# Patient Record
Sex: FEMALE | Race: ASIAN | NOT HISPANIC OR LATINO
[De-identification: names, ages, dates, MRNs, and addresses within clinical notes are randomized per-mention and may not be internally consistent; named-entity substitution may affect disease eponyms.]

---

## 2018-11-01 ENCOUNTER — RESULT REVIEW (OUTPATIENT)
Age: 63
End: 2018-11-01

## 2018-11-29 ENCOUNTER — TRANSCRIPTION ENCOUNTER (OUTPATIENT)
Age: 63
End: 2018-11-29

## 2019-04-09 ENCOUNTER — TRANSCRIPTION ENCOUNTER (OUTPATIENT)
Age: 64
End: 2019-04-09

## 2019-09-25 PROBLEM — Z00.00 ENCOUNTER FOR PREVENTIVE HEALTH EXAMINATION: Status: ACTIVE | Noted: 2019-09-25

## 2019-10-03 ENCOUNTER — NON-APPOINTMENT (OUTPATIENT)
Age: 64
End: 2019-10-03

## 2019-10-03 ENCOUNTER — APPOINTMENT (OUTPATIENT)
Dept: CARDIOLOGY | Facility: CLINIC | Age: 64
End: 2019-10-03
Payer: MEDICARE

## 2019-10-03 VITALS
HEIGHT: 64 IN | WEIGHT: 144 LBS | OXYGEN SATURATION: 99 % | SYSTOLIC BLOOD PRESSURE: 137 MMHG | DIASTOLIC BLOOD PRESSURE: 85 MMHG | HEART RATE: 58 BPM | BODY MASS INDEX: 24.59 KG/M2

## 2019-10-03 DIAGNOSIS — Z78.9 OTHER SPECIFIED HEALTH STATUS: ICD-10-CM

## 2019-10-03 DIAGNOSIS — R06.09 OTHER FORMS OF DYSPNEA: ICD-10-CM

## 2019-10-03 DIAGNOSIS — R07.89 OTHER CHEST PAIN: ICD-10-CM

## 2019-10-03 DIAGNOSIS — E78.5 HYPERLIPIDEMIA, UNSPECIFIED: ICD-10-CM

## 2019-10-03 DIAGNOSIS — E11.9 TYPE 2 DIABETES MELLITUS W/OUT COMPLICATIONS: ICD-10-CM

## 2019-10-03 DIAGNOSIS — I20.9 ANGINA PECTORIS, UNSPECIFIED: ICD-10-CM

## 2019-10-03 PROCEDURE — 99205 OFFICE O/P NEW HI 60 MIN: CPT

## 2019-10-03 PROCEDURE — 93000 ELECTROCARDIOGRAM COMPLETE: CPT

## 2019-10-03 NOTE — REVIEW OF SYSTEMS
[Dyspnea on exertion] : dyspnea during exertion [Chest  Pressure] : chest pressure [Chest Pain] : chest pain [Negative] : Endocrine

## 2019-10-04 NOTE — HISTORY OF PRESENT ILLNESS
[FreeTextEntry1] : 64 year old woman with a history of diabetes,. hypertension presents for an initial cardiac evaluation. \par \par She is complaining of intermittent chest pain that happens when she is doing household chores.  is complaining of dyspnea on exertion and mild chest pressure when climbing the stairs.  Her symptoms resolves with rest within seconds to minutes.  This has started over the last 5 weeks and appears to have been increasing in frequency. She   denies any PND, orthopnea, lower extremity edema, near syncope, syncope, strokelike symptoms. She is compliant with her medications.

## 2019-10-04 NOTE — DISCUSSION/SUMMARY
[FreeTextEntry1] : 64 year woman with a history as listed presents for an initial cardiac evaluation. \par Kaci is complaining of more typical anginal symptoms. Hr EKG shows TWI anteriorly which may be a concern for ischemia. Given her comorbidities, symptomatology, abnormal EKG, I am referring her for diagnostic coronary angiography. She will get a 2d echo to assess for any  new structural heart disease, changes in valvular and ventricular function. She will start taking ASA. She will continue on her statin for now. Her blood pressure and heart rate are controlled. Her HR precludes starting a beta blocker. \par She will continue with her diabetic care per your instructions. \par She will followup with me in 1 months time or sooner if needed.

## 2019-10-04 NOTE — PHYSICAL EXAM
[General Appearance - In No Acute Distress] : no acute distress [Well Groomed] : well groomed [Normal Conjunctiva] : the conjunctiva exhibited no abnormalities [Eyelids - No Xanthelasma] : the eyelids demonstrated no xanthelasmas [No Oral Pallor] : no oral pallor [Normal Oral Mucosa] : normal oral mucosa [No Oral Cyanosis] : no oral cyanosis [Normal Jugular Venous A Waves Present] : normal jugular venous A waves present [No Jugular Venous Tony A Waves] : no jugular venous tony A waves [Normal Jugular Venous V Waves Present] : normal jugular venous V waves present [Normal Rate] : normal [Rhythm Regular] : regular [Normal S1] : normal S1 [Normal S2] : normal S2 [No Murmur] : no murmurs heard [2+] : left 2+ [No Pitting Edema] : no pitting edema present [Respiration, Rhythm And Depth] : normal respiratory rhythm and effort [Exaggerated Use Of Accessory Muscles For Inspiration] : no accessory muscle use [Auscultation Breath Sounds / Voice Sounds] : lungs were clear to auscultation bilaterally [Abdomen Soft] : soft [Abdomen Tenderness] : non-tender [Abdomen Mass (___ Cm)] : no abdominal mass palpated [Abnormal Walk] : normal gait [Nail Clubbing] : no clubbing of the fingernails [Gait - Sufficient For Exercise Testing] : the gait was sufficient for exercise testing [Cyanosis, Localized] : no localized cyanosis [Petechial Hemorrhages (___cm)] : no petechial hemorrhages [Skin Color & Pigmentation] : normal skin color and pigmentation [No Venous Stasis] : no venous stasis [] : no rash [Skin Lesions] : no skin lesions [No Skin Ulcers] : no skin ulcer [No Xanthoma] : no  xanthoma was observed [Oriented To Time, Place, And Person] : oriented to person, place, and time [Affect] : the affect was normal [Mood] : the mood was normal [No Anxiety] : not feeling anxious [Right Carotid Bruit] : no bruit heard over the right carotid [Left Carotid Bruit] : no bruit heard over the left carotid [Bruit] : no bruit heard

## 2019-10-08 ENCOUNTER — APPOINTMENT (OUTPATIENT)
Dept: CARDIOLOGY | Facility: CLINIC | Age: 64
End: 2019-10-08
Payer: MEDICARE

## 2019-10-08 PROCEDURE — 93306 TTE W/DOPPLER COMPLETE: CPT

## 2019-10-18 ENCOUNTER — OUTPATIENT (OUTPATIENT)
Dept: OUTPATIENT SERVICES | Facility: HOSPITAL | Age: 64
LOS: 1 days | End: 2019-10-18
Payer: COMMERCIAL

## 2019-10-18 VITALS
HEART RATE: 77 BPM | TEMPERATURE: 98 F | SYSTOLIC BLOOD PRESSURE: 171 MMHG | HEIGHT: 64 IN | DIASTOLIC BLOOD PRESSURE: 77 MMHG | OXYGEN SATURATION: 100 % | RESPIRATION RATE: 20 BRPM | WEIGHT: 139.99 LBS

## 2019-10-18 DIAGNOSIS — I20.9 ANGINA PECTORIS, UNSPECIFIED: ICD-10-CM

## 2019-10-18 LAB
ALBUMIN SERPL ELPH-MCNC: 4.8 G/DL — SIGNIFICANT CHANGE UP (ref 3.3–5)
ALP SERPL-CCNC: 96 U/L — SIGNIFICANT CHANGE UP (ref 40–120)
ALT FLD-CCNC: 20 U/L — SIGNIFICANT CHANGE UP (ref 10–45)
ANION GAP SERPL CALC-SCNC: 15 MMOL/L — SIGNIFICANT CHANGE UP (ref 5–17)
AST SERPL-CCNC: 16 U/L — SIGNIFICANT CHANGE UP (ref 10–40)
BILIRUB SERPL-MCNC: 0.3 MG/DL — SIGNIFICANT CHANGE UP (ref 0.2–1.2)
BUN SERPL-MCNC: 13 MG/DL — SIGNIFICANT CHANGE UP (ref 7–23)
CALCIUM SERPL-MCNC: 9.8 MG/DL — SIGNIFICANT CHANGE UP (ref 8.4–10.5)
CHLORIDE SERPL-SCNC: 100 MMOL/L — SIGNIFICANT CHANGE UP (ref 96–108)
CO2 SERPL-SCNC: 27 MMOL/L — SIGNIFICANT CHANGE UP (ref 22–31)
CREAT SERPL-MCNC: 0.69 MG/DL — SIGNIFICANT CHANGE UP (ref 0.5–1.3)
GLUCOSE BLDC GLUCOMTR-MCNC: 143 MG/DL — HIGH (ref 70–99)
GLUCOSE BLDC GLUCOMTR-MCNC: 183 MG/DL — HIGH (ref 70–99)
GLUCOSE SERPL-MCNC: 148 MG/DL — HIGH (ref 70–99)
HCT VFR BLD CALC: 37.2 % — SIGNIFICANT CHANGE UP (ref 34.5–45)
HGB BLD-MCNC: 12.3 G/DL — SIGNIFICANT CHANGE UP (ref 11.5–15.5)
MCHC RBC-ENTMCNC: 26.7 PG — LOW (ref 27–34)
MCHC RBC-ENTMCNC: 33.1 GM/DL — SIGNIFICANT CHANGE UP (ref 32–36)
MCV RBC AUTO: 80.9 FL — SIGNIFICANT CHANGE UP (ref 80–100)
NRBC # BLD: 0 /100 WBCS — SIGNIFICANT CHANGE UP (ref 0–0)
PLATELET # BLD AUTO: 507 K/UL — HIGH (ref 150–400)
POTASSIUM SERPL-MCNC: 4.1 MMOL/L — SIGNIFICANT CHANGE UP (ref 3.5–5.3)
POTASSIUM SERPL-SCNC: 4.1 MMOL/L — SIGNIFICANT CHANGE UP (ref 3.5–5.3)
PROT SERPL-MCNC: 8 G/DL — SIGNIFICANT CHANGE UP (ref 6–8.3)
RBC # BLD: 4.6 M/UL — SIGNIFICANT CHANGE UP (ref 3.8–5.2)
RBC # FLD: 13 % — SIGNIFICANT CHANGE UP (ref 10.3–14.5)
SODIUM SERPL-SCNC: 142 MMOL/L — SIGNIFICANT CHANGE UP (ref 135–145)
WBC # BLD: 7.73 K/UL — SIGNIFICANT CHANGE UP (ref 3.8–10.5)
WBC # FLD AUTO: 7.73 K/UL — SIGNIFICANT CHANGE UP (ref 3.8–10.5)

## 2019-10-18 PROCEDURE — 80053 COMPREHEN METABOLIC PANEL: CPT

## 2019-10-18 PROCEDURE — C1887: CPT

## 2019-10-18 PROCEDURE — C1894: CPT

## 2019-10-18 PROCEDURE — 82962 GLUCOSE BLOOD TEST: CPT

## 2019-10-18 PROCEDURE — 93454 CORONARY ARTERY ANGIO S&I: CPT | Mod: 26,GC

## 2019-10-18 PROCEDURE — 93010 ELECTROCARDIOGRAM REPORT: CPT

## 2019-10-18 PROCEDURE — 93454 CORONARY ARTERY ANGIO S&I: CPT

## 2019-10-18 PROCEDURE — 93005 ELECTROCARDIOGRAM TRACING: CPT

## 2019-10-18 PROCEDURE — 85027 COMPLETE CBC AUTOMATED: CPT

## 2019-10-18 PROCEDURE — C1769: CPT

## 2019-10-18 RX ORDER — SODIUM CHLORIDE 9 MG/ML
500 INJECTION INTRAMUSCULAR; INTRAVENOUS; SUBCUTANEOUS ONCE
Refills: 0 | Status: DISCONTINUED | OUTPATIENT
Start: 2019-10-18 | End: 2019-11-04

## 2019-10-18 NOTE — CHART NOTE - NSCHARTNOTEFT_GEN_A_CORE
patient s/p dx cath via RFA ( see cath report below), received 1mg of Versed and 25 mcg of Fentanyl in lab     < from: Cardiac Cath Lab - Adult (10.18.19 @ 08:50) >    HISTORY: There was no prior cardiac history. The patient has hypertension  and medication-treated dyslipidemia.  PROCEDURE:  --  Left coronary angiography.  --  Right coronary angiography.  --  Sonosite - Diagnostic.  TECHNIQUE: The risks and alternatives of the procedures and conscious  sedation were explained to the patient and informed consent was obtained.  Cardiac catheterization performed electively.  Local anesthetic given. Right femoral artery access. Left coronary artery  angiography. The vessel was injected utilizinga catheter. Right coronary  artery angiography. The vessel was injected utilizing a catheter. Sonosite  - Diagnostic. RADIATION EXPOSURE: 2.6 min.  CONTRAST GIVEN: Omnipaque 27 ml.  MEDICATIONS GIVEN: Midazolam, 1 mg, IV. Fentanyl, 25 mcg, IV.  CORONARY VESSELS: The coronary circulation is right dominant.  LM:   --  LM: Normal.  LAD:   --  Mid LAD: There was a 20 % stenosis.  CX:   --  Circumflex: Normal.  RCA:   --  RCA: Normal.  COMPLICATIONS: There were no complications.  DIAGNOSTIC RECOMMENDATIONS: The patient should continue with the present  medications.  Prepared and signed by  Catrachito Hood M.D.  Signed 10/18/2019 10:48:10  HEMODYNAMIC TABLES    < end of copied text >    called at bed side by primary nurse with c/c of " dizziness"  as per nurse patient is now 2 hours post cardiac cath   right groin assessed and benign w/o bleeding or hematoma, no pain at site, no chest pain no lower back pain as per patient   BP noted to have dropped to 85/50 most like 2/2 sedation received in lab, NS 500cc Iv bolus given, BP responded well to IVF fluids now 127/50, HR remained WNL in the 70's   Dr Hood made aware, came to bedside and agreed with assessment and intervention.   patient to be dc home today after recovery, as per patient and  pt has been experiencing episodes of dizziness in the past, reason why they went to see DR Julián Casillas and lead to further work up.

## 2019-10-18 NOTE — H&P CARDIOLOGY - HISTORY OF PRESENT ILLNESS
63 y/o female with PMH of HLD, NIDDM, ARPITA, no known cardiac disease, now p/w c/o intermittent chest pain that happens when she is doing household chores. associated with MULLIGAN when climbing the stairs. Her symptoms resolves with rest within seconds to minutes. This has started over the last 5 weeks and appears to have been increasing in frequency. She denies any PND, orthopnea, lower extremity edema, near syncope, syncope, strokelike symptoms. She is compliant with her medications. Echo done on 10/2019  is normal Ef~65% seen & evaluated by cardiologist Dr Julián delatorre & now recommends for cardiac cath.    no implantable cardiac device

## 2019-11-08 ENCOUNTER — NON-APPOINTMENT (OUTPATIENT)
Age: 64
End: 2019-11-08

## 2019-11-08 ENCOUNTER — APPOINTMENT (OUTPATIENT)
Dept: CARDIOLOGY | Facility: CLINIC | Age: 64
End: 2019-11-08
Payer: MEDICARE

## 2019-11-08 VITALS — SYSTOLIC BLOOD PRESSURE: 80 MMHG | DIASTOLIC BLOOD PRESSURE: 50 MMHG

## 2019-11-08 VITALS
WEIGHT: 143 LBS | DIASTOLIC BLOOD PRESSURE: 72 MMHG | SYSTOLIC BLOOD PRESSURE: 120 MMHG | HEART RATE: 77 BPM | HEIGHT: 64 IN | OXYGEN SATURATION: 99 % | BODY MASS INDEX: 24.41 KG/M2

## 2019-11-08 PROBLEM — E11.9 TYPE 2 DIABETES MELLITUS WITHOUT COMPLICATIONS: Chronic | Status: ACTIVE | Noted: 2019-10-18

## 2019-11-08 PROBLEM — N39.3 STRESS INCONTINENCE (FEMALE) (MALE): Chronic | Status: ACTIVE | Noted: 2019-10-18

## 2019-11-08 PROBLEM — E78.5 HYPERLIPIDEMIA, UNSPECIFIED: Chronic | Status: ACTIVE | Noted: 2019-10-18

## 2019-11-08 PROCEDURE — 99214 OFFICE O/P EST MOD 30 MIN: CPT

## 2019-11-08 PROCEDURE — 93000 ELECTROCARDIOGRAM COMPLETE: CPT

## 2019-11-08 NOTE — REVIEW OF SYSTEMS
[Chest Pain] : chest pain [Dyspnea on exertion] : dyspnea during exertion [Chest  Pressure] : chest pressure [Negative] : Heme/Lymph

## 2019-11-13 NOTE — PHYSICAL EXAM
[Well Groomed] : well groomed [General Appearance - In No Acute Distress] : no acute distress [Normal Conjunctiva] : the conjunctiva exhibited no abnormalities [Eyelids - No Xanthelasma] : the eyelids demonstrated no xanthelasmas [No Oral Pallor] : no oral pallor [Normal Jugular Venous A Waves Present] : normal jugular venous A waves present [No Oral Cyanosis] : no oral cyanosis [Respiration, Rhythm And Depth] : normal respiratory rhythm and effort [Normal Jugular Venous V Waves Present] : normal jugular venous V waves present [No Jugular Venous Tony A Waves] : no jugular venous tony A waves [Exaggerated Use Of Accessory Muscles For Inspiration] : no accessory muscle use [Abdomen Soft] : soft [Auscultation Breath Sounds / Voice Sounds] : lungs were clear to auscultation bilaterally [Abdomen Tenderness] : non-tender [Abnormal Walk] : normal gait [Abdomen Mass (___ Cm)] : no abdominal mass palpated [Gait - Sufficient For Exercise Testing] : the gait was sufficient for exercise testing [Cyanosis, Localized] : no localized cyanosis [Nail Clubbing] : no clubbing of the fingernails [Petechial Hemorrhages (___cm)] : no petechial hemorrhages [Skin Color & Pigmentation] : normal skin color and pigmentation [Skin Lesions] : no skin lesions [No Skin Ulcers] : no skin ulcer [No Venous Stasis] : no venous stasis [] : no rash [Affect] : the affect was normal [Oriented To Time, Place, And Person] : oriented to person, place, and time [No Xanthoma] : no  xanthoma was observed [No Anxiety] : not feeling anxious [Normal Rate] : normal [Mood] : the mood was normal [Normal S2] : normal S2 [Normal S1] : normal S1 [Rhythm Regular] : regular [No Murmur] : no murmurs heard [2+] : right 2+ [No Pitting Edema] : no pitting edema present [FreeTextEntry1] : dry MM [Right Carotid Bruit] : no bruit heard over the right carotid [Left Carotid Bruit] : no bruit heard over the left carotid [Bruit] : no bruit heard

## 2019-11-13 NOTE — DISCUSSION/SUMMARY
[FreeTextEntry1] : 64 year woman with a history as listed presents for an initial cardiac evaluation. \par Kaci is complaining of near syncope. She appears dry on exam and mildly orthostatic. She is encouraged to increase her PO intake of fluid. She will stop the Mybertiq. \par She denies any anginal symptoms. Her coronary angiography showed nonobstructive CAD. She had an echo in 10/8/19 that showed normal systolic LV function without any significant other findings, including no significant valvular disease. \par She will continue with statin therapy to achieve maintain goal LDL<100. \par Exercise and diet counseling was performed in order to reduce her future cardiovascular risk.\par She will followup with the nurses in 1 week for a repeat BP check.

## 2019-11-13 NOTE — HISTORY OF PRESENT ILLNESS
[FreeTextEntry1] : 64 year old woman with a history of diabetes,. hypertension presents for a followup cardiac evaluation. \par \par Since her last visit she had coronary angiogram on 10/2019 that showed nonobstructive CAD. \par \par She is still complaining of dyspnea on exertion and mild chest pressure when climbing the stairs. She has been limiting her actives. Her symptoms resolves with rest within seconds to minutes.  Now she recalls that many of her symptoms started after starting the Mybertriq. She is complaining also of near syncope when standing. She states that she is only drinking about 4 glasses of water. \par She   denies any PND, orthopnea, lower extremity edema,  syncope, strokelike symptoms. She is compliant with her medications.

## 2019-11-22 ENCOUNTER — APPOINTMENT (OUTPATIENT)
Dept: CARDIOLOGY | Facility: CLINIC | Age: 64
End: 2019-11-22
Payer: MEDICARE

## 2019-11-22 VITALS
HEART RATE: 69 BPM | BODY MASS INDEX: 24.41 KG/M2 | DIASTOLIC BLOOD PRESSURE: 74 MMHG | SYSTOLIC BLOOD PRESSURE: 146 MMHG | HEIGHT: 64 IN | OXYGEN SATURATION: 98 % | WEIGHT: 143 LBS

## 2019-11-22 PROCEDURE — 93000 ELECTROCARDIOGRAM COMPLETE: CPT

## 2019-11-22 PROCEDURE — 99214 OFFICE O/P EST MOD 30 MIN: CPT

## 2019-11-22 NOTE — PHYSICAL EXAM
[Well Groomed] : well groomed [General Appearance - In No Acute Distress] : no acute distress [Normal Conjunctiva] : the conjunctiva exhibited no abnormalities [Eyelids - No Xanthelasma] : the eyelids demonstrated no xanthelasmas [No Oral Pallor] : no oral pallor [No Oral Cyanosis] : no oral cyanosis [FreeTextEntry1] : dry MM [Normal Jugular Venous A Waves Present] : normal jugular venous A waves present [Normal Jugular Venous V Waves Present] : normal jugular venous V waves present [No Jugular Venous Tony A Waves] : no jugular venous tony A waves [Respiration, Rhythm And Depth] : normal respiratory rhythm and effort [Exaggerated Use Of Accessory Muscles For Inspiration] : no accessory muscle use [Auscultation Breath Sounds / Voice Sounds] : lungs were clear to auscultation bilaterally [Abdomen Soft] : soft [Abdomen Tenderness] : non-tender [Abdomen Mass (___ Cm)] : no abdominal mass palpated [Abnormal Walk] : normal gait [Gait - Sufficient For Exercise Testing] : the gait was sufficient for exercise testing [Nail Clubbing] : no clubbing of the fingernails [Cyanosis, Localized] : no localized cyanosis [Petechial Hemorrhages (___cm)] : no petechial hemorrhages [Skin Color & Pigmentation] : normal skin color and pigmentation [] : no rash [No Venous Stasis] : no venous stasis [Skin Lesions] : no skin lesions [No Skin Ulcers] : no skin ulcer [No Xanthoma] : no  xanthoma was observed [Oriented To Time, Place, And Person] : oriented to person, place, and time [Affect] : the affect was normal [Mood] : the mood was normal [No Anxiety] : not feeling anxious [Normal Rate] : normal [Rhythm Regular] : regular [Normal S1] : normal S1 [Normal S2] : normal S2 [No Murmur] : no murmurs heard [Right Carotid Bruit] : no bruit heard over the right carotid [Left Carotid Bruit] : no bruit heard over the left carotid [2+] : left 2+ [Bruit] : no bruit heard [No Pitting Edema] : no pitting edema present

## 2019-11-22 NOTE — REVIEW OF SYSTEMS
[Dyspnea on exertion] : dyspnea during exertion [Chest  Pressure] : no chest pressure [Chest Pain] : chest pain [Negative] : Heme/Lymph

## 2019-11-22 NOTE — DISCUSSION/SUMMARY
[FreeTextEntry1] : 64 year woman with a history as listed presents for a followup cardiac evaluation. \par Kaci is feeling better after hydrating. She is now longer feeling near syncopal. She is encouraged to increase her PO intake of fluid. She will followup with a new  soon. \par She denies any anginal symptoms. Her coronary angiography showed nonobstructive CAD. She had an echo in 10/8/19 that showed normal systolic LV function without any significant other findings, including no significant valvular disease. Her MULLIGAN is more likely from deconditioning.\par She will continue with statin therapy to achieve maintain goal LDL<100. \par Exercise and diet counseling was performed in order to reduce her future cardiovascular risk.\par She will followup with the nurses in 3 months  for a repeat BP check.

## 2019-11-22 NOTE — HISTORY OF PRESENT ILLNESS
[FreeTextEntry1] : 64 year old woman with a history of diabetes,. hypertension.\par She had coronary angiogram on 10/2019 that showed nonobstructive CAD. \par \par She presents for a followup cardiac evaluation. \par She is no longer complaining of significant lightheadedness. She has been trying to hydrate but finds it difficult given her urinary issues. She is back taking the Myrbetriq. \par She is still complaining of dyspnea on exertion and mild chest pressure when climbing the stairs but it has improved.  She   denies any PND, orthopnea, lower extremity edema,  syncope, strokelike symptoms. She is compliant with her medications.

## 2019-11-28 ENCOUNTER — TRANSCRIPTION ENCOUNTER (OUTPATIENT)
Age: 64
End: 2019-11-28

## 2020-01-30 ENCOUNTER — APPOINTMENT (OUTPATIENT)
Dept: UROLOGY | Facility: CLINIC | Age: 65
End: 2020-01-30
Payer: MEDICARE

## 2020-01-30 VITALS
DIASTOLIC BLOOD PRESSURE: 74 MMHG | RESPIRATION RATE: 17 BRPM | SYSTOLIC BLOOD PRESSURE: 137 MMHG | TEMPERATURE: 98.4 F | HEIGHT: 60 IN | WEIGHT: 140 LBS | HEART RATE: 80 BPM | BODY MASS INDEX: 27.48 KG/M2

## 2020-01-30 DIAGNOSIS — Z80.3 FAMILY HISTORY OF MALIGNANT NEOPLASM OF BREAST: ICD-10-CM

## 2020-01-30 PROCEDURE — 99204 OFFICE O/P NEW MOD 45 MIN: CPT

## 2020-01-30 RX ORDER — MIRABEGRON 50 MG/1
50 TABLET, FILM COATED, EXTENDED RELEASE ORAL
Refills: 0 | Status: COMPLETED | COMMUNITY
Start: 2019-11-08 | End: 2020-01-30

## 2020-01-30 NOTE — REVIEW OF SYSTEMS
[Dry Eyes] : dryness of the eyes [Feeling Tired] : feeling tired [Abdominal Pain] : abdominal pain [Shortness Of Breath] : shortness of breath [Joint Swelling] : joint swelling [Joint Pain] : joint pain [Heartburn] : heartburn [Feelings Of Weakness] : feelings of weakness [Anxiety] : anxiety [Negative] : Heme/Lymph

## 2020-01-31 PROBLEM — Z80.3 FAMILY HISTORY OF MALIGNANT NEOPLASM OF BREAST: Status: ACTIVE | Noted: 2020-01-30

## 2020-01-31 NOTE — ASSESSMENT
[FreeTextEntry1] : CT images reviewed today.  Agree with findings, 2 cm soft tissue mass in the base of the bladder.  No evidence of hydronephrosis.  No evidence of extension beyond the bladder wall.\par \par Recommend that she undergo a cystoscopy and transurethral section of bladder tumor.  Procedure reviewed, risks discussed.  We will need medical clearance prior to surgery.\par \par Further treatment dependent on final pathology results.\par \par Urinary frequency: Currently on mirabegron on but minimal benefit.  Recommend to do discontinue and start oxybutynin ER 10 mg once daily.  Goals of medication reviewed.  Discussed the potential adverse effects of the medication.  Discussed the proper administration of the medication.\par

## 2020-01-31 NOTE — HISTORY OF PRESENT ILLNESS
[FreeTextEntry1] : Initially presented with non specific abdominal pain. Was seen by GI and underwent CT a/p w/ IV contrast.  Incidental finding of 2 centimeters bladder mass.  Patient denies gross hematuria, dysuria.  No previous history of bladder abnormalities.  Has baseline urinary frequency and urgency which is been unchanged over the last several years.  No previous  surgery.  Denies stress or urge incontinence.  No history of kidney stones, no flank pain.

## 2020-02-27 ENCOUNTER — OUTPATIENT (OUTPATIENT)
Dept: OUTPATIENT SERVICES | Facility: HOSPITAL | Age: 65
LOS: 1 days | End: 2020-02-27

## 2020-02-27 VITALS
HEART RATE: 76 BPM | WEIGHT: 143.96 LBS | RESPIRATION RATE: 15 BRPM | DIASTOLIC BLOOD PRESSURE: 80 MMHG | SYSTOLIC BLOOD PRESSURE: 130 MMHG | OXYGEN SATURATION: 98 % | HEIGHT: 64 IN | TEMPERATURE: 98 F

## 2020-02-27 DIAGNOSIS — D49.4 NEOPLASM OF UNSPECIFIED BEHAVIOR OF BLADDER: ICD-10-CM

## 2020-02-27 DIAGNOSIS — C50.919 MALIGNANT NEOPLASM OF UNSPECIFIED SITE OF UNSPECIFIED FEMALE BREAST: Chronic | ICD-10-CM

## 2020-02-27 DIAGNOSIS — D41.4 NEOPLASM OF UNCERTAIN BEHAVIOR OF BLADDER: ICD-10-CM

## 2020-02-27 DIAGNOSIS — Z87.898 PERSONAL HISTORY OF OTHER SPECIFIED CONDITIONS: ICD-10-CM

## 2020-02-27 DIAGNOSIS — E11.9 TYPE 2 DIABETES MELLITUS WITHOUT COMPLICATIONS: ICD-10-CM

## 2020-02-27 LAB
ANION GAP SERPL CALC-SCNC: 14 MMO/L — SIGNIFICANT CHANGE UP (ref 7–14)
BUN SERPL-MCNC: 16 MG/DL — SIGNIFICANT CHANGE UP (ref 7–23)
CALCIUM SERPL-MCNC: 9.2 MG/DL — SIGNIFICANT CHANGE UP (ref 8.4–10.5)
CHLORIDE SERPL-SCNC: 101 MMOL/L — SIGNIFICANT CHANGE UP (ref 98–107)
CO2 SERPL-SCNC: 24 MMOL/L — SIGNIFICANT CHANGE UP (ref 22–31)
CREAT SERPL-MCNC: 0.61 MG/DL — SIGNIFICANT CHANGE UP (ref 0.5–1.3)
GLUCOSE SERPL-MCNC: 191 MG/DL — HIGH (ref 70–99)
HBA1C BLD-MCNC: 8.1 % — HIGH (ref 4–5.6)
HCT VFR BLD CALC: 35.9 % — SIGNIFICANT CHANGE UP (ref 34.5–45)
HGB BLD-MCNC: 11.2 G/DL — LOW (ref 11.5–15.5)
MCHC RBC-ENTMCNC: 26.4 PG — LOW (ref 27–34)
MCHC RBC-ENTMCNC: 31.2 % — LOW (ref 32–36)
MCV RBC AUTO: 84.7 FL — SIGNIFICANT CHANGE UP (ref 80–100)
NRBC # FLD: 0 K/UL — SIGNIFICANT CHANGE UP (ref 0–0)
PLATELET # BLD AUTO: 452 K/UL — HIGH (ref 150–400)
PMV BLD: 10.2 FL — SIGNIFICANT CHANGE UP (ref 7–13)
POTASSIUM SERPL-MCNC: 4.2 MMOL/L — SIGNIFICANT CHANGE UP (ref 3.5–5.3)
POTASSIUM SERPL-SCNC: 4.2 MMOL/L — SIGNIFICANT CHANGE UP (ref 3.5–5.3)
RBC # BLD: 4.24 M/UL — SIGNIFICANT CHANGE UP (ref 3.8–5.2)
RBC # FLD: 13.1 % — SIGNIFICANT CHANGE UP (ref 10.3–14.5)
SODIUM SERPL-SCNC: 139 MMOL/L — SIGNIFICANT CHANGE UP (ref 135–145)
WBC # BLD: 6.95 K/UL — SIGNIFICANT CHANGE UP (ref 3.8–10.5)
WBC # FLD AUTO: 6.95 K/UL — SIGNIFICANT CHANGE UP (ref 3.8–10.5)

## 2020-02-27 RX ORDER — ASPIRIN/CALCIUM CARB/MAGNESIUM 324 MG
1 TABLET ORAL
Qty: 0 | Refills: 0 | DISCHARGE

## 2020-02-27 RX ORDER — SODIUM CHLORIDE 9 MG/ML
1000 INJECTION, SOLUTION INTRAVENOUS
Refills: 0 | Status: DISCONTINUED | OUTPATIENT
Start: 2020-03-06 | End: 2020-03-21

## 2020-02-27 RX ORDER — MIRABEGRON 50 MG/1
1 TABLET, EXTENDED RELEASE ORAL
Qty: 0 | Refills: 0 | DISCHARGE

## 2020-02-27 NOTE — H&P PST ADULT - NSICDXPROBLEM_GEN_ALL_CORE_FT
PROBLEM DIAGNOSES  Problem: Bladder tumor  Assessment and Plan:  Cystoscopy, TURBT 3/6/20.   CBC BMP Hgba1C Urine culture  Pt to see PMD for pre-op eval, appt scheduled 3/6/20.  pre-op instrucitons given and expalined    Problem: History of chest pain  Assessment and Plan: Angiogram and echo done 10/2019 on chart . Card. eval 11/22/19 on chart    Problem: Diabetes mellitus  Assessment and Plan: Pt advised not to take Glimipiride morning of surgery.  accuchek on admission

## 2020-02-27 NOTE — H&P PST ADULT - SYMPTOMS
Pt had episodes of CP while doing housework 10/2019.  Card. eval including angiogram was done, normal report.  Pt reports she occasionally gets brief  episdoes of CP lasting 30 seconds, non radiating, no accompanying  dyspnea. Per pt, she was told symptom due to anxiety/chest pain

## 2020-02-27 NOTE — H&P PST ADULT - ASSESSMENT
63 y/o female with urinary frequency x1 year.  REcently dx with neoplasm of bladder.  Scheduled for Cystoscopy, TURBT 3/6/20.

## 2020-02-27 NOTE — H&P PST ADULT - HISTORY OF PRESENT ILLNESS
65 y/o female with urinary frequency x1 year.  REcently dx with neoplasm of bladder.  Scheduled for Cystoscopy, TURBT 3/6/20.

## 2020-02-27 NOTE — H&P PST ADULT - NSICDXPASTMEDICALHX_GEN_ALL_CORE_FT
PAST MEDICAL HISTORY:  Diabetes mellitus, type 2     HLD (hyperlipidemia)     Stress incontinence in female PAST MEDICAL HISTORY:  Bladder mass     Diabetes mellitus, type 2     HLD (hyperlipidemia)     Stress incontinence in female PAST MEDICAL HISTORY:  Bladder mass     Chronic GERD     Diabetes mellitus, type 2     HLD (hyperlipidemia)     Stress incontinence in female

## 2020-02-27 NOTE — H&P PST ADULT - CARDIOVASCULAR COMMENTS
Pt reports brief episodes of CP while doing housework 10/2019.  CP lasts 30 seconds, and is non radiating.  Denies accomanying dyspnea.  Pt was seen by CArdiolgoist, Angiogram and echo were done and WNL per pt.  Pt reports she occasionally gets these episodes of CP while doing housework.  Per pt, she was told it was due to "anxiety" Pt reports brief episodes of CP while doing housework 10/2019.  CP lasts 30 seconds, non radiating.  Denies accompanying dyspnea.  Pt was seen by Cardiologist,  Angiogram and echo were done and WNL per pt.  Pt reports she occasionally gets these episodes of CP while doing housework.  Pr pt, she was told the CP was due to "anxiety"

## 2020-02-27 NOTE — H&P PST ADULT - GENITOURINARY COMMENTS
urinary frequency x1 year.  Recently dx with neoplasm of bladder.  Scheduled for Cystoscopy, TURBT 3/6/20.

## 2020-02-28 LAB
BACTERIA UR CULT: SIGNIFICANT CHANGE UP
SPECIMEN SOURCE: SIGNIFICANT CHANGE UP

## 2020-03-05 ENCOUNTER — TRANSCRIPTION ENCOUNTER (OUTPATIENT)
Age: 65
End: 2020-03-05

## 2020-03-06 ENCOUNTER — RESULT REVIEW (OUTPATIENT)
Age: 65
End: 2020-03-06

## 2020-03-06 ENCOUNTER — OUTPATIENT (OUTPATIENT)
Dept: OUTPATIENT SERVICES | Facility: HOSPITAL | Age: 65
LOS: 1 days | Discharge: HOME | End: 2020-03-06
Payer: MEDICARE

## 2020-03-06 ENCOUNTER — APPOINTMENT (OUTPATIENT)
Dept: UROLOGY | Facility: HOSPITAL | Age: 65
End: 2020-03-06

## 2020-03-06 VITALS
OXYGEN SATURATION: 96 % | RESPIRATION RATE: 16 BRPM | HEART RATE: 80 BPM | DIASTOLIC BLOOD PRESSURE: 50 MMHG | SYSTOLIC BLOOD PRESSURE: 111 MMHG

## 2020-03-06 VITALS
OXYGEN SATURATION: 98 % | HEIGHT: 64 IN | DIASTOLIC BLOOD PRESSURE: 63 MMHG | WEIGHT: 143.96 LBS | TEMPERATURE: 98 F | SYSTOLIC BLOOD PRESSURE: 144 MMHG | RESPIRATION RATE: 18 BRPM | HEART RATE: 79 BPM

## 2020-03-06 DIAGNOSIS — C50.919 MALIGNANT NEOPLASM OF UNSPECIFIED SITE OF UNSPECIFIED FEMALE BREAST: Chronic | ICD-10-CM

## 2020-03-06 DIAGNOSIS — D41.4 NEOPLASM OF UNCERTAIN BEHAVIOR OF BLADDER: ICD-10-CM

## 2020-03-06 LAB — GLUCOSE BLDC GLUCOMTR-MCNC: 140 MG/DL — HIGH (ref 70–99)

## 2020-03-06 PROCEDURE — 88341 IMHCHEM/IMCYTCHM EA ADD ANTB: CPT | Mod: 26

## 2020-03-06 PROCEDURE — 88331 PATH CONSLTJ SURG 1 BLK 1SPC: CPT | Mod: 26

## 2020-03-06 PROCEDURE — 88305 TISSUE EXAM BY PATHOLOGIST: CPT | Mod: 26

## 2020-03-06 PROCEDURE — 88342 IMHCHEM/IMCYTCHM 1ST ANTB: CPT | Mod: 26

## 2020-03-06 PROCEDURE — 88307 TISSUE EXAM BY PATHOLOGIST: CPT | Mod: 26

## 2020-03-06 PROCEDURE — 52235 CYSTOSCOPY AND TREATMENT: CPT

## 2020-03-06 RX ORDER — OXYBUTYNIN CHLORIDE 5 MG
1 TABLET ORAL
Qty: 0 | Refills: 0 | DISCHARGE

## 2020-03-06 RX ORDER — GLIMEPIRIDE 1 MG
1 TABLET ORAL
Qty: 0 | Refills: 0 | DISCHARGE

## 2020-03-06 RX ORDER — HYDROMORPHONE HYDROCHLORIDE 2 MG/ML
0.25 INJECTION INTRAMUSCULAR; INTRAVENOUS; SUBCUTANEOUS
Refills: 0 | Status: DISCONTINUED | OUTPATIENT
Start: 2020-03-06 | End: 2020-03-06

## 2020-03-06 RX ORDER — ATORVASTATIN CALCIUM 80 MG/1
1 TABLET, FILM COATED ORAL
Qty: 0 | Refills: 0 | DISCHARGE

## 2020-03-06 RX ORDER — LABETALOL HCL 100 MG
10 TABLET ORAL ONCE
Refills: 0 | Status: COMPLETED | OUTPATIENT
Start: 2020-03-06 | End: 2020-03-06

## 2020-03-06 RX ORDER — ONDANSETRON 8 MG/1
4 TABLET, FILM COATED ORAL ONCE
Refills: 0 | Status: DISCONTINUED | OUTPATIENT
Start: 2020-03-06 | End: 2020-03-21

## 2020-03-06 RX ORDER — OMEPRAZOLE 10 MG/1
1 CAPSULE, DELAYED RELEASE ORAL
Qty: 0 | Refills: 0 | DISCHARGE

## 2020-03-06 RX ORDER — ACETAMINOPHEN 500 MG
1000 TABLET ORAL ONCE
Refills: 0 | Status: COMPLETED | OUTPATIENT
Start: 2020-03-06 | End: 2020-03-06

## 2020-03-06 RX ORDER — FAMOTIDINE 10 MG/ML
1 INJECTION INTRAVENOUS
Qty: 0 | Refills: 0 | DISCHARGE

## 2020-03-06 RX ADMIN — Medication 10 MILLIGRAM(S): at 16:25

## 2020-03-06 RX ADMIN — Medication 400 MILLIGRAM(S): at 18:30

## 2020-03-06 NOTE — ASU DISCHARGE PLAN (ADULT/PEDIATRIC) - CALL YOUR DOCTOR IF YOU HAVE ANY OF THE FOLLOWING:
No urine in bag/Nausea and vomiting that does not stop/Fever greater than (need to indicate Fahrenheit or Celsius) No urine in bag/Fever greater than (need to indicate Fahrenheit or Celsius)/Pain not relieved by Medications/Bleeding that does not stop/Nausea and vomiting that does not stop/Inability to tolerate liquids or foods

## 2020-03-06 NOTE — BRIEF OPERATIVE NOTE - NSICDXBRIEFPOSTOP_GEN_ALL_CORE_FT
POST-OP DIAGNOSIS:  Bladder neoplasm of uncertain malignant potential 20-Mar-2020 13:38:01  Indra Morales

## 2020-03-06 NOTE — ASU DISCHARGE PLAN (ADULT/PEDIATRIC) - CARE PROVIDER_API CALL
Indra Morales)  Urology  [address] [place] [address], [place] [address]  Phone: [phone]  Fax: [phone]  Follow Up Time:

## 2020-03-06 NOTE — ASU PATIENT PROFILE, ADULT - PMH
Bladder mass    Chronic GERD    Diabetes mellitus, type 2    HLD (hyperlipidemia)    Stress incontinence in female

## 2020-03-06 NOTE — BRIEF OPERATIVE NOTE - NSICDXBRIEFPREOP_GEN_ALL_CORE_FT
PRE-OP DIAGNOSIS:  Bladder neoplasm of uncertain malignant potential 20-Mar-2020 13:37:46  Indra Morales

## 2020-03-06 NOTE — BRIEF OPERATIVE NOTE - NSICDXBRIEFPROCEDURE_GEN_ALL_CORE_FT
PROCEDURES:  Cystourethroscopy with bladder tumor resection, medium 20-Mar-2020 13:37:31  Indra Morales

## 2020-03-06 NOTE — ASU DISCHARGE PLAN (ADULT/PEDIATRIC) - ASU DC SPECIAL INSTRUCTIONSFT
Empty franklin bag as needed as instructed.  Drink plenty of fluids.  No heavy lifting or straining, avoid constipation. You may have intermittent pink tinged urine and small amounts of leakage around franklin (due to bladder spasms).  This is normal.   If your urine becomes bright red or with clots, or there is no urine in the bag, please call the office.   Follow up with Dr. Morales on Tuesday, 3/10 for franklin removal and further management.  Call the office if you have fever greater than 101, no urine in bag, pain not relieved with pain medication, nausea/vomiting.

## 2020-03-06 NOTE — ASU DISCHARGE PLAN (ADULT/PEDIATRIC) - NURSING INSTRUCTIONS
Place franklin bag to side of bed lower than your bladder. During the day you may use your leg bag and empty it every 2 hrs. If ther is no urine in the bag for over 2 hrs call MD and return to ER.

## 2020-03-09 PROBLEM — K21.9 GASTRO-ESOPHAGEAL REFLUX DISEASE WITHOUT ESOPHAGITIS: Chronic | Status: ACTIVE | Noted: 2020-02-27

## 2020-03-09 PROBLEM — N32.89 OTHER SPECIFIED DISORDERS OF BLADDER: Chronic | Status: ACTIVE | Noted: 2020-02-27

## 2020-03-10 ENCOUNTER — APPOINTMENT (OUTPATIENT)
Dept: UROLOGY | Facility: CLINIC | Age: 65
End: 2020-03-10
Payer: MEDICARE

## 2020-03-10 PROCEDURE — 99213 OFFICE O/P EST LOW 20 MIN: CPT

## 2020-03-13 ENCOUNTER — APPOINTMENT (OUTPATIENT)
Dept: UROLOGY | Facility: AMBULATORY SURGERY CENTER | Age: 65
End: 2020-03-13

## 2020-03-13 LAB — SURGICAL PATHOLOGY STUDY: SIGNIFICANT CHANGE UP

## 2020-03-16 DIAGNOSIS — D41.4 NEOPLASM OF UNCERTAIN BEHAVIOR OF BLADDER: ICD-10-CM

## 2020-03-16 RX ORDER — ATORVASTATIN CALCIUM 10 MG/1
10 TABLET, FILM COATED ORAL
Qty: 90 | Refills: 0 | Status: ACTIVE | COMMUNITY
Start: 2020-01-13

## 2020-06-15 ENCOUNTER — RX RENEWAL (OUTPATIENT)
Age: 65
End: 2020-06-15

## 2020-07-16 ENCOUNTER — APPOINTMENT (OUTPATIENT)
Dept: UROLOGY | Facility: CLINIC | Age: 65
End: 2020-07-16
Payer: MEDICARE

## 2020-07-16 VITALS — TEMPERATURE: 98.3 F

## 2020-07-16 DIAGNOSIS — D49.4 NEOPLASM OF UNSPECIFIED BEHAVIOR OF BLADDER: ICD-10-CM

## 2020-07-16 PROCEDURE — 99214 OFFICE O/P EST MOD 30 MIN: CPT

## 2020-07-16 RX ORDER — MIRABEGRON 25 MG/1
25 TABLET, FILM COATED, EXTENDED RELEASE ORAL
Qty: 30 | Refills: 0 | Status: COMPLETED | COMMUNITY
Start: 2019-09-03 | End: 2020-07-16

## 2020-07-16 RX ORDER — ATORVASTATIN CALCIUM 20 MG/1
20 TABLET, FILM COATED ORAL
Qty: 30 | Refills: 5 | Status: COMPLETED | COMMUNITY
Start: 2019-10-03 | End: 2020-07-16

## 2020-07-16 RX ORDER — OXYBUTYNIN CHLORIDE 10 MG/1
10 TABLET, EXTENDED RELEASE ORAL DAILY
Qty: 30 | Refills: 3 | Status: DISCONTINUED | COMMUNITY
Start: 2020-01-30 | End: 2020-07-16

## 2020-07-26 NOTE — PHYSICAL EXAM
[General Appearance - Well Developed] : well developed [Normal Appearance] : normal appearance [General Appearance - Well Nourished] : well nourished [Well Groomed] : well groomed [General Appearance - In No Acute Distress] : no acute distress [Abdomen Soft] : soft [Abdomen Tenderness] : non-tender [Costovertebral Angle Tenderness] : no ~M costovertebral angle tenderness [Edema] : no peripheral edema [Urinary Bladder Findings] : the bladder was normal on palpation [] : no respiratory distress [Respiration, Rhythm And Depth] : normal respiratory rhythm and effort [Exaggerated Use Of Accessory Muscles For Inspiration] : no accessory muscle use

## 2020-07-26 NOTE — HISTORY OF PRESENT ILLNESS
[FreeTextEntry1] : s/p TURBT 3/6/2020\par Intra-op findings consistent with leiomyoma.  \par Path: benign leiomyoma.\par \par Has been on oxybutynin ER 10 mg for urianry symptoms but feels that continues to have frequency, urgency and nocturia that is bothersome. No concern about flow.  Denies hematuria, dysuria.\par No abdominal or flank pain.

## 2020-07-26 NOTE — ASSESSMENT
[FreeTextEntry1] : Recommend to discontinue oxybutynin and start trospium 20 mg.\par Goals of medication reviewed.  Discussed the potential adverse effects of the medication.  Discussed the proper administration of the medication.\par Will call in one month to report on efficacy of medication.\par Recommend MRI pelvis to confirm no recurrence of leiomyoma.

## 2020-07-26 NOTE — LETTER BODY
[Dear  ___] : Dear  [unfilled], [FreeTextEntry1] : I had the pleasure of seeing your patient, CIRO WILDE, in the office today.  \par \par Please see my office note below.\par \par Thank you for allowing me to participate in his care and please do not hesitate to contact me with any questions or concerns regarding her care.\par \par Sincerely,\par \par Indra Morales MD\par Chief of Urology, [facility]\par  of Urology\par [address] [place] [address]\[place] [address]\par PH:      [phone]\par Email:  [email or web address]

## 2020-08-09 RX ORDER — TROSPIUM CHLORIDE 20 MG/1
20 TABLET, FILM COATED ORAL
Qty: 90 | Refills: 3 | Status: DISCONTINUED | COMMUNITY
Start: 2020-07-16 | End: 2020-08-09

## 2020-08-12 ENCOUNTER — APPOINTMENT (OUTPATIENT)
Dept: MRI IMAGING | Facility: CLINIC | Age: 65
End: 2020-08-12

## 2020-09-12 ENCOUNTER — APPOINTMENT (OUTPATIENT)
Dept: MRI IMAGING | Facility: CLINIC | Age: 65
End: 2020-09-12

## 2020-12-18 ENCOUNTER — APPOINTMENT (OUTPATIENT)
Dept: CARDIOLOGY | Facility: CLINIC | Age: 65
End: 2020-12-18
Payer: MEDICARE

## 2020-12-18 ENCOUNTER — NON-APPOINTMENT (OUTPATIENT)
Age: 65
End: 2020-12-18

## 2020-12-18 VITALS
SYSTOLIC BLOOD PRESSURE: 130 MMHG | HEIGHT: 60 IN | BODY MASS INDEX: 27.29 KG/M2 | DIASTOLIC BLOOD PRESSURE: 76 MMHG | OXYGEN SATURATION: 100 % | WEIGHT: 139 LBS | HEART RATE: 91 BPM

## 2020-12-18 PROCEDURE — 99072 ADDL SUPL MATRL&STAF TM PHE: CPT

## 2020-12-18 PROCEDURE — 99214 OFFICE O/P EST MOD 30 MIN: CPT

## 2020-12-18 PROCEDURE — 93000 ELECTROCARDIOGRAM COMPLETE: CPT

## 2020-12-18 RX ORDER — OMEPRAZOLE 20 MG/1
20 CAPSULE, DELAYED RELEASE ORAL
Qty: 30 | Refills: 0 | Status: DISCONTINUED | COMMUNITY
Start: 2020-02-25 | End: 2020-12-18

## 2020-12-18 RX ORDER — FAMOTIDINE 20 MG/1
20 TABLET, FILM COATED ORAL
Qty: 30 | Refills: 0 | Status: DISCONTINUED | COMMUNITY
Start: 2020-02-25 | End: 2020-12-18

## 2020-12-18 RX ORDER — GLIPIZIDE 2.5 MG/1
2.5 TABLET, FILM COATED, EXTENDED RELEASE ORAL DAILY
Qty: 30 | Refills: 5 | Status: DISCONTINUED | COMMUNITY
Start: 2019-11-08 | End: 2020-12-18

## 2020-12-18 RX ORDER — GLIMEPIRIDE 1 MG/1
1 TABLET ORAL
Qty: 180 | Refills: 0 | Status: DISCONTINUED | COMMUNITY
Start: 2020-01-10 | End: 2020-12-18

## 2020-12-18 RX ORDER — MIRABEGRON 25 MG/1
25 TABLET, FILM COATED, EXTENDED RELEASE ORAL
Qty: 30 | Refills: 3 | Status: DISCONTINUED | COMMUNITY
Start: 2020-08-09 | End: 2020-12-18

## 2020-12-18 NOTE — REVIEW OF SYSTEMS
[Dyspnea on exertion] : dyspnea during exertion [Chest  Pressure] : no chest pressure [Chest Pain] : chest pain [Depression] : depression [Anxiety] : anxiety [Under Stress] : under stress [Negative] : Heme/Lymph

## 2020-12-18 NOTE — HISTORY OF PRESENT ILLNESS
[FreeTextEntry1] : 65 year old woman with a history of diabetes, hypertension.\par She had coronary angiogram on 10/2019 that showed nonobstructive CAD. \par \par She presents for a followup cardiac evaluation. \par She has been more stressed. She is having more family stress with her son and daughter. She notes that she feels dyspnea and chest pressure when she is emotionally stressed. \par She   denies any PND, orthopnea, lower extremity edema,  syncope, strokelike symptoms. No reported melena, hematochezia or hematemesis. \par She is compliant with her medications.

## 2020-12-18 NOTE — DISCUSSION/SUMMARY
[FreeTextEntry1] : 64 year woman with a history as listed presents for a followup cardiac evaluation. \par Kaci's complaints are related to her stress. She appears to having a panic attack. She should consider starting SSRI therapy. She will followup with your for further recommendations. \par She denies any anginal symptoms. Her coronary angiography showed nonobstructive CAD. She had an echo in 10/8/19 that showed normal systolic LV function without any significant other findings, including no significant valvular disease. Her MULLIGAN is more likely from deconditioning.\par She will continue with statin therapy to achieve maintain goal LDL<100. \par She will start PT \par  At your convenience, please fax me her latest lab results including lipid profile. \par Exercise and diet counseling was performed in order to reduce her future cardiovascular risk.\par She will followup with the nurses in 6 months.

## 2021-02-08 NOTE — H&P PST ADULT - PRO ARRIVE FROM
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  Wife has slight COVID symptoms of runny nose.  States both wife and himself got tested yesterday at [facility] and are waiting for test results.  Requesting a letter stating had to get tested due to wife's exposure, may need an additional letter if test comes back positive.    home

## 2021-06-03 ENCOUNTER — APPOINTMENT (OUTPATIENT)
Dept: CARDIOLOGY | Facility: CLINIC | Age: 66
End: 2021-06-03

## 2022-02-03 ENCOUNTER — TRANSCRIPTION ENCOUNTER (OUTPATIENT)
Age: 67
End: 2022-02-03

## 2022-05-07 ENCOUNTER — NON-APPOINTMENT (OUTPATIENT)
Age: 67
End: 2022-05-07

## 2022-05-08 ENCOUNTER — EMERGENCY (EMERGENCY)
Facility: HOSPITAL | Age: 67
LOS: 1 days | Discharge: HOME | End: 2022-05-08
Attending: STUDENT IN AN ORGANIZED HEALTH CARE EDUCATION/TRAINING PROGRAM | Admitting: STUDENT IN AN ORGANIZED HEALTH CARE EDUCATION/TRAINING PROGRAM
Payer: COMMERCIAL

## 2022-05-08 VITALS
SYSTOLIC BLOOD PRESSURE: 140 MMHG | HEART RATE: 80 BPM | OXYGEN SATURATION: 97 % | DIASTOLIC BLOOD PRESSURE: 81 MMHG | RESPIRATION RATE: 16 BRPM

## 2022-05-08 VITALS
DIASTOLIC BLOOD PRESSURE: 86 MMHG | SYSTOLIC BLOOD PRESSURE: 145 MMHG | TEMPERATURE: 100 F | HEIGHT: 65 IN | WEIGHT: 143.08 LBS | RESPIRATION RATE: 17 BRPM | HEART RATE: 80 BPM | OXYGEN SATURATION: 96 %

## 2022-05-08 DIAGNOSIS — C50.919 MALIGNANT NEOPLASM OF UNSPECIFIED SITE OF UNSPECIFIED FEMALE BREAST: Chronic | ICD-10-CM

## 2022-05-08 PROCEDURE — 99282 EMERGENCY DEPT VISIT SF MDM: CPT

## 2022-05-08 PROCEDURE — 99284 EMERGENCY DEPT VISIT MOD MDM: CPT

## 2022-05-08 RX ORDER — SODIUM CHLORIDE 0.65 %
2 AEROSOL, SPRAY (ML) NASAL
Qty: 1 | Refills: 0
Start: 2022-05-08 | End: 2022-06-06

## 2022-05-08 RX ORDER — FLUTICASONE PROPIONATE 50 MCG
1 SPRAY, SUSPENSION NASAL
Qty: 30 | Refills: 0
Start: 2022-05-08 | End: 2022-06-06

## 2022-05-08 NOTE — ED PROVIDER NOTE - NSFOLLOWUPINSTRUCTIONS_ED_ALL_ED_FT
Currently you do not meet criteria for admission to the hospital.  You are being sent home at this time, but you need to put yourself on HOME ISOLATION.  It is currently recommended at this time that you isolate for the next 14 days unless further instructions are provided at a later date.  When home on home isolation please try to use your own bathroom and bedroom, in an effort to prevent the spread to anyone in your household.  Continue Tylenol per label instructions as needed for fever and body aches  Advance activity as tolerated  Please return to the ED for increased difficulty breathing or signs of respiratory distress, unable to eat / drink, dizziness , passing out, chest pains, or any other concerning symptoms.          COVID-19 (Coronavirus Disease 2019)    WHAT YOU NEED TO KNOW:    What do I need to know about COVID-19? COVID-19 is the disease caused by a coronavirus first discovered in December 2019. Coronaviruses generally cause upper respiratory (nose, throat, and lung) infections, such as a cold. The 2019 virus spreads quickly and easily. It can be spread starting 2 to 3 days before symptoms even begin.    What do I need to know about variants? The virus has changed into several new forms (called variants) since it was discovered. The variants may be more contagious (easily spread) than the original form. Some may also cause more severe illness than others.    What are the signs and symptoms of COVID-19? You may not develop any signs or symptoms. Signs and symptoms usually start about 5 days after infection but can take 2 to 14 days. You may feel like you have the flu or a bad cold. Some signs and symptoms go away in a few days. Others can last weeks, months, or possibly years. You may have any of the following:  •A cough      •Shortness of breath or trouble breathing that may become severe      •A fever      •Chills that might include shaking      •Muscle pain, body aches, or a headache      •A sore throat      •Sudden changes or loss of your taste or smell      •Feeling mentally and physically tired (fatigue)      •Congestion (stuffy head and nose), or a runny nose      •Diarrhea, nausea, or vomiting      How is COVID-19 diagnosed? Your healthcare provider will examine you and ask about your symptoms. Tell him or her if you have any health conditions or are taking any medicines. Any of the following tests may be used:  •A viral test shows if you have a current infection. Some tests are available for you to do at home. Most use a nasal swab and give the result within 15 minutes. These tests are available at many pharmacy stores. Talk to your provider or pharmacist if you have any questions about this type of test. Testing sites are also available. A sample is taken from your nose or throat with a swab. You may need to quarantine until you get the results from a testing site.      •An antigen test shows if you have a protein from the COVID-19 virus. This test is often called a rapid test because the results can be available in 30 minutes or less.      •An antibody test shows if you had a recent or past infection. Blood samples are used for this test. Antibodies are made by your immune system to fight the virus that causes COVID-19. Antibodies form 1 to 3 weeks after you are infected. This test is not used to show if you are immune to the virus.      •A CT, MRI, ultrasound, or x-ray may be used to check for complications of COVID-19. These may include pneumonia, blood clots, or other complications.      How is COVID-19 treated?   •Mild symptoms may get better on their own. Some treatments have emergency use authorization (EUA). Examples include monoclonal antibodies and convalescent plasma. These may be given to help prevent worsening of your symptoms. You may also need any of the following:?Decongestants help reduce nasal congestion and help you breathe more easily. If you take decongestant pills, they may make you feel restless or cause problems with your sleep. Do not use decongestant sprays for more than a few days.      ?Cough suppressants help reduce coughing. Ask your healthcare provider which type of cough medicine is best for you.      ?To soothe a sore throat, gargle with warm salt water, or use throat lozenges or a throat spray. Drink more liquids to thin and loosen mucus and to prevent dehydration.      ?NSAIDs or acetaminophen can help lower a fever and relieve body aches or a headache. Follow directions. If not taken correctly, NSAIDs can cause kidney damage and acetaminophen can cause liver damage.      •Severe or life-threatening symptoms are treated in the hospital. You may need any of the following: ?Medicines may be given to fight the virus or treat inflammation.      ?Blood thinners help prevent or treat blood clots. If you have a deep vein thrombosis (DVT) or pulmonary embolism (PE), you may need to use blood thinners for at least 3 months.      ?Extra oxygen may be given if you have respiratory failure. This means your lungs cannot get enough oxygen into your blood and out to your organs.      ?A ventilator may be used to help you breathe.        What do I need to know about health problems the virus may cause? You may develop long-term health problems caused by the virus. Your risk is higher if you are 65 or older. A weak immune system, obesity, diabetes, chronic kidney disease, or a heart or lung condition can also increase your risk. Your risk is also higher if you are a current or former cigarette smoker. COVID-19 can lead to any of the following:  •Multisymptom inflammatory syndrome in adults (MIS-A) or in children (MIS-C), causing inflammation in the heart, digestive system, skin, or brain      •Shortness of breath, serious lower respiratory conditions, such as pneumonia or acute respiratory distress syndrome (ARDS)      •Blood clots or blood vessel damage      •Organ damage from a lack of oxygen or from blood clots      •Sleep problems      •Problems thinking clearly, remembering information, or concentrating      •Mood changes, depression, or anxiety      •Long-term problems tasting or smelling      •Loss of appetite and weight loss      •Nerve pain      •Fatigue (feeling mentally and physically tired)      How does the 2019 coronavirus spread?   •Droplets are the main way all coronaviruses spread. The virus travels in droplets that form when a person talks, sings, coughs, or sneezes. The droplets can also float in the air for minutes or hours. Infection happens when you breathe in the droplets or get them in your eyes or nose. Close personal contact with an infected person increases your risk for infection. This means being within 6 feet (2 meters) of the person for at least 15 minutes over 24 hours.      •Person-to-person contact can spread the virus. For example, a person with the virus on his or her hands can spread it by shaking hands with someone.      •The virus can stay on objects and surfaces for up to 3 days. You may become infected by touching the object or surface and then touching your eyes or mouth.      What do I need to know about COVID-19 vaccines? Healthcare providers recommend a COVID-19 vaccine, even if you have already had COVID-19. You are considered fully vaccinated against COVID-19 two weeks after the final dose of any COVID-19 vaccine. Let your healthcare provider know when you have received the final dose of the vaccine. Make a copy of your vaccination card. Keep the original with you in case you need to show it. Keep the copy in a safe place.  •COVID-19 vaccines are given as a shot in 1 or 2 doses.   Vaccination is recommended for everyone 5 years or older.?One 2-dose vaccine is fully approved for those 16 years or older. This vaccine also has an emergency use authorization (EUA) for children 5 to 15 years old. An EUA means the vaccine is not officially approved but is used because the benefits outweigh the risks. No vaccine is currently available for children younger than 5 years.      ?One 2-dose vaccine is fully approved for adults 18 years or older. This vaccine is not currently given to children 17 years or younger.      ?A booster (additional) dose helps the immune system continue to protect against severe COVID-19.?A booster is recommended for all adults 18 or older. The booster can be a different brand of the COVID-19 vaccine than you originally received. The timing for the booster depends on the type of vaccine you received:?1-dose vaccine: The booster is given at least 2 months after you received the vaccine.      ?2-dose vaccine: The booster is given at least 5 or 6 months after the second dose. A second booster is recommended for all adults 50 or older and for immunocompromised adults 18 or older. Your healthcare provider will tell you when to get this booster.      ?A booster can be given to adolescents 12 to 17 years old. Only 1 COVID-19 vaccine has this EUA. The booster is given at least 5 months after the second dose of the original vaccine series. A second booster is recommended for immunocompromised adolescents. Your adolescent's healthcare provider will tell you when this booster should be given.      ?A booster is recommended for immunocompromised children 5 to 11 years old. Only 1 COVID-19 vaccine has this EUA. The booster is given 28 days after the second dose.      COVID-19 Immunization Schedule         •Continue social distancing and other measures. You can become infected even after you get the vaccine. You may also be able to pass the virus to others without knowing you are infected.      •After you get the vaccine, check local, national, and international travel rules. You may need to be tested before you travel. Some countries require proof of a negative test before you travel. You may also need to quarantine after you return.      •Medicine may be given to prevent infection. The medicine can be given if you are at high risk for infection and cannot get the vaccine. It can also be given if your immune system does not respond well to the vaccine.      How can I help lower the risk for COVID-19?   •Wash your hands often throughout the day. Use soap and water. Rub your soapy hands together, lacing your fingers, for at least 20 seconds. Rinse with warm, running water. Dry your hands with a clean towel or paper towel. Use hand  that contains alcohol if soap and water are not available. Teach children how to wash their hands and use hand .  Handwashing           •Cover sneezes and coughs. Turn your face away and cover your mouth and nose with a tissue. Throw the tissue away. Use the bend of your arm if a tissue is not available. Then wash your hands well with soap and water or use hand . Teach children how to cover a cough or sneeze.      •Wear a face covering (mask) when needed. Use a cloth covering with at least 2 layers. You can also create layers by putting a cloth covering over a disposable non-medical mask. Cover your mouth and your nose.  How to Wear a Face Covering (Mask)           •Follow worldwide, national, and local social distancing guidelines. Keep at least 6 feet (2 meters) between you and others.      •Try not to touch your face. If you get the virus on your hands, you can transfer it to your eyes, nose, or mouth and become infected. You can also transfer it to objects, surfaces, or people.      •Clean and disinfect high-touch surfaces and objects often. Use disinfecting wipes, or make a solution of 4 teaspoons of bleach in 1 quart (4 cups) of water.      •Ask about other vaccines you may need. Get the influenza (flu) vaccine as soon as recommended each year, usually starting in September or October. Get the pneumonia vaccine if recommended. Your healthcare provider can tell you if you should also get other vaccines, and when to get them.    Prevent COVID-19 Infection         How do I follow social distancing guidelines to help lower the risk for COVID-19? National and local social distancing rules vary. Rules and restrictions may change over time as restrictions are lifted. The following are general guidelines:  •Stay home if you are sick or think you may have COVID-19. It is important to stay home if you are waiting for a testing appointment or for test results.      •Avoid close physical contact with anyone who does not live in your home. Do not shake hands with, hug, or kiss a person as a greeting. If you must use public transportation (such as a bus or subway), try to sit or stand away from others. Wear your face covering.      •Avoid in-person gatherings and crowds. Attend virtually if possible.      Where can I find more information?   •[employer] Disease Control and Prevention  [address] [place] [address]  Phone: 1-334.423.9770  Web Address: http://www.cdc.gov        Call your local emergency number (911 in the [place]) if:   •You have trouble breathing or shortness of breath at rest.      •You have chest pain or pressure that lasts longer than 5 minutes.      •You become confused or hard to wake.      •Your lips or face are blue.      When should I seek immediate care?   •You have a fever of 104°F (40°C) or higher.          When should I call my doctor?   •You have symptoms of COVID-19.      •You have questions or concerns about your condition or care.      CARE AGREEMENT:    You have the right to help plan your care. Learn about your health condition and how it may be treated. Discuss treatment options with your healthcare providers to decide what care you want to receive. You always have the right to refuse treatment. As there is no positive COVID test in the system at [facility], we cannot refer you for Monoclonal antibodies. Please call your PMD or the Urgent care where you were seen for referral.  You can also call [phone] for more information.  Currently you do not meet criteria for admission to the hospital.  You are being sent home at this time, but you need to put yourself on HOME ISOLATION.  It is currently recommended at this time that you isolate for the next 14 days unless further instructions are provided at a later date.  When home on home isolation please try to use your own bathroom and bedroom, in an effort to prevent the spread to anyone in your household.  Continue Tylenol per label instructions as needed for fever and body aches  Advance activity as tolerated  Please return to the ED for increased difficulty breathing or signs of respiratory distress, unable to eat / drink, dizziness , passing out, chest pains, or any other concerning symptoms.          COVID-19 (Coronavirus Disease 2019)    WHAT YOU NEED TO KNOW:    What do I need to know about COVID-19? COVID-19 is the disease caused by a coronavirus first discovered in December 2019. Coronaviruses generally cause upper respiratory (nose, throat, and lung) infections, such as a cold. The 2019 virus spreads quickly and easily. It can be spread starting 2 to 3 days before symptoms even begin.    What do I need to know about variants? The virus has changed into several new forms (called variants) since it was discovered. The variants may be more contagious (easily spread) than the original form. Some may also cause more severe illness than others.    What are the signs and symptoms of COVID-19? You may not develop any signs or symptoms. Signs and symptoms usually start about 5 days after infection but can take 2 to 14 days. You may feel like you have the flu or a bad cold. Some signs and symptoms go away in a few days. Others can last weeks, months, or possibly years. You may have any of the following:  •A cough      •Shortness of breath or trouble breathing that may become severe      •A fever      •Chills that might include shaking      •Muscle pain, body aches, or a headache      •A sore throat      •Sudden changes or loss of your taste or smell      •Feeling mentally and physically tired (fatigue)      •Congestion (stuffy head and nose), or a runny nose      •Diarrhea, nausea, or vomiting      How is COVID-19 diagnosed? Your healthcare provider will examine you and ask about your symptoms. Tell him or her if you have any health conditions or are taking any medicines. Any of the following tests may be used:  •A viral test shows if you have a current infection. Some tests are available for you to do at home. Most use a nasal swab and give the result within 15 minutes. These tests are available at many pharmacy stores. Talk to your provider or pharmacist if you have any questions about this type of test. Testing sites are also available. A sample is taken from your nose or throat with a swab. You may need to quarantine until you get the results from a testing site.      •An antigen test shows if you have a protein from the COVID-19 virus. This test is often called a rapid test because the results can be available in 30 minutes or less.      •An antibody test shows if you had a recent or past infection. Blood samples are used for this test. Antibodies are made by your immune system to fight the virus that causes COVID-19. Antibodies form 1 to 3 weeks after you are infected. This test is not used to show if you are immune to the virus.      •A CT, MRI, ultrasound, or x-ray may be used to check for complications of COVID-19. These may include pneumonia, blood clots, or other complications.      How is COVID-19 treated?   •Mild symptoms may get better on their own. Some treatments have emergency use authorization (EUA). Examples include monoclonal antibodies and convalescent plasma. These may be given to help prevent worsening of your symptoms. You may also need any of the following:?Decongestants help reduce nasal congestion and help you breathe more easily. If you take decongestant pills, they may make you feel restless or cause problems with your sleep. Do not use decongestant sprays for more than a few days.      ?Cough suppressants help reduce coughing. Ask your healthcare provider which type of cough medicine is best for you.      ?To soothe a sore throat, gargle with warm salt water, or use throat lozenges or a throat spray. Drink more liquids to thin and loosen mucus and to prevent dehydration.      ?NSAIDs or acetaminophen can help lower a fever and relieve body aches or a headache. Follow directions. If not taken correctly, NSAIDs can cause kidney damage and acetaminophen can cause liver damage.      •Severe or life-threatening symptoms are treated in the hospital. You may need any of the following: ?Medicines may be given to fight the virus or treat inflammation.      ?Blood thinners help prevent or treat blood clots. If you have a deep vein thrombosis (DVT) or pulmonary embolism (PE), you may need to use blood thinners for at least 3 months.      ?Extra oxygen may be given if you have respiratory failure. This means your lungs cannot get enough oxygen into your blood and out to your organs.      ?A ventilator may be used to help you breathe.        What do I need to know about health problems the virus may cause? You may develop long-term health problems caused by the virus. Your risk is higher if you are 65 or older. A weak immune system, obesity, diabetes, chronic kidney disease, or a heart or lung condition can also increase your risk. Your risk is also higher if you are a current or former cigarette smoker. COVID-19 can lead to any of the following:  •Multisymptom inflammatory syndrome in adults (MIS-A) or in children (MIS-C), causing inflammation in the heart, digestive system, skin, or brain      •Shortness of breath, serious lower respiratory conditions, such as pneumonia or acute respiratory distress syndrome (ARDS)      •Blood clots or blood vessel damage      •Organ damage from a lack of oxygen or from blood clots      •Sleep problems      •Problems thinking clearly, remembering information, or concentrating      •Mood changes, depression, or anxiety      •Long-term problems tasting or smelling      •Loss of appetite and weight loss      •Nerve pain      •Fatigue (feeling mentally and physically tired)      How does the 2019 coronavirus spread?   •Droplets are the main way all coronaviruses spread. The virus travels in droplets that form when a person talks, sings, coughs, or sneezes. The droplets can also float in the air for minutes or hours. Infection happens when you breathe in the droplets or get them in your eyes or nose. Close personal contact with an infected person increases your risk for infection. This means being within 6 feet (2 meters) of the person for at least 15 minutes over 24 hours.      •Person-to-person contact can spread the virus. For example, a person with the virus on his or her hands can spread it by shaking hands with someone.      •The virus can stay on objects and surfaces for up to 3 days. You may become infected by touching the object or surface and then touching your eyes or mouth.      What do I need to know about COVID-19 vaccines? Healthcare providers recommend a COVID-19 vaccine, even if you have already had COVID-19. You are considered fully vaccinated against COVID-19 two weeks after the final dose of any COVID-19 vaccine. Let your healthcare provider know when you have received the final dose of the vaccine. Make a copy of your vaccination card. Keep the original with you in case you need to show it. Keep the copy in a safe place.  •COVID-19 vaccines are given as a shot in 1 or 2 doses.   Vaccination is recommended for everyone 5 years or older.?One 2-dose vaccine is fully approved for those 16 years or older. This vaccine also has an emergency use authorization (EUA) for children 5 to 15 years old. An EUA means the vaccine is not officially approved but is used because the benefits outweigh the risks. No vaccine is currently available for children younger than 5 years.      ?One 2-dose vaccine is fully approved for adults 18 years or older. This vaccine is not currently given to children 17 years or younger.      ?A booster (additional) dose helps the immune system continue to protect against severe COVID-19.?A booster is recommended for all adults 18 or older. The booster can be a different brand of the COVID-19 vaccine than you originally received. The timing for the booster depends on the type of vaccine you received:?1-dose vaccine: The booster is given at least 2 months after you received the vaccine.      ?2-dose vaccine: The booster is given at least 5 or 6 months after the second dose. A second booster is recommended for all adults 50 or older and for immunocompromised adults 18 or older. Your healthcare provider will tell you when to get this booster.      ?A booster can be given to adolescents 12 to 17 years old. Only 1 COVID-19 vaccine has this EUA. The booster is given at least 5 months after the second dose of the original vaccine series. A second booster is recommended for immunocompromised adolescents. Your adolescent's healthcare provider will tell you when this booster should be given.      ?A booster is recommended for immunocompromised children 5 to 11 years old. Only 1 COVID-19 vaccine has this EUA. The booster is given 28 days after the second dose.      COVID-19 Immunization Schedule         •Continue social distancing and other measures. You can become infected even after you get the vaccine. You may also be able to pass the virus to others without knowing you are infected.      •After you get the vaccine, check local, national, and international travel rules. You may need to be tested before you travel. Some countries require proof of a negative test before you travel. You may also need to quarantine after you return.      •Medicine may be given to prevent infection. The medicine can be given if you are at high risk for infection and cannot get the vaccine. It can also be given if your immune system does not respond well to the vaccine.      How can I help lower the risk for COVID-19?   •Wash your hands often throughout the day. Use soap and water. Rub your soapy hands together, lacing your fingers, for at least 20 seconds. Rinse with warm, running water. Dry your hands with a clean towel or paper towel. Use hand  that contains alcohol if soap and water are not available. Teach children how to wash their hands and use hand .  Handwashing           •Cover sneezes and coughs. Turn your face away and cover your mouth and nose with a tissue. Throw the tissue away. Use the bend of your arm if a tissue is not available. Then wash your hands well with soap and water or use hand . Teach children how to cover a cough or sneeze.      •Wear a face covering (mask) when needed. Use a cloth covering with at least 2 layers. You can also create layers by putting a cloth covering over a disposable non-medical mask. Cover your mouth and your nose.  How to Wear a Face Covering (Mask)           •Follow worldwide, national, and local social distancing guidelines. Keep at least 6 feet (2 meters) between you and others.      •Try not to touch your face. If you get the virus on your hands, you can transfer it to your eyes, nose, or mouth and become infected. You can also transfer it to objects, surfaces, or people.      •Clean and disinfect high-touch surfaces and objects often. Use disinfecting wipes, or make a solution of 4 teaspoons of bleach in 1 quart (4 cups) of water.      •Ask about other vaccines you may need. Get the influenza (flu) vaccine as soon as recommended each year, usually starting in September or October. Get the pneumonia vaccine if recommended. Your healthcare provider can tell you if you should also get other vaccines, and when to get them.    Prevent COVID-19 Infection         How do I follow social distancing guidelines to help lower the risk for COVID-19? National and local social distancing rules vary. Rules and restrictions may change over time as restrictions are lifted. The following are general guidelines:  •Stay home if you are sick or think you may have COVID-19. It is important to stay home if you are waiting for a testing appointment or for test results.      •Avoid close physical contact with anyone who does not live in your home. Do not shake hands with, hug, or kiss a person as a greeting. If you must use public transportation (such as a bus or subway), try to sit or stand away from others. Wear your face covering.      •Avoid in-person gatherings and crowds. Attend virtually if possible.      Where can I find more information?   •Centers for Disease Control and Prevention  [address] [place] [address]  Phone: 1-494.744.4179  Web Address: http://www.cdc.gov        Call your local emergency number (911 in the [place]) if:   •You have trouble breathing or shortness of breath at rest.      •You have chest pain or pressure that lasts longer than 5 minutes.      •You become confused or hard to wake.      •Your lips or face are blue.      When should I seek immediate care?   •You have a fever of 104°F (40°C) or higher.          When should I call my doctor?   •You have symptoms of COVID-19.      •You have questions or concerns about your condition or care.      CARE AGREEMENT:    You have the right to help plan your care. Learn about your health condition and how it may be treated. Discuss treatment options with your healthcare providers to decide what care you want to receive. You always have the right to refuse treatment.

## 2022-05-08 NOTE — ED PROVIDER NOTE - CLINICAL SUMMARY MEDICAL DECISION MAKING FREE TEXT BOX
COVID+, sent from urgent care due to concern for hypoxia and for MAB. patient not hypoxic here, appears non-toxic. No covid result in system, so cannot be referred for MAB from [facility].

## 2022-05-08 NOTE — ED PROVIDER NOTE - NS ED ATTENDING STATEMENT MOD
This was a shared visit with the CHARLY. I reviewed and verified the documentation and independently performed the documented:

## 2022-05-08 NOTE — ED ADULT NURSE NOTE - OBJECTIVE STATEMENT
Pt received in bed alert and oriented and resting in bed with the c/o being covid19 + and requesting monoclonial antibody. Pt ambulated and tolerated well. Nursing care ongoing and tolerated well.

## 2022-05-08 NOTE — ED PROVIDER NOTE - PATIENT PORTAL LINK FT
You can access the FollowMyHealth Patient Portal offered by [facility] by registering at the following website: [email or web address]. By joining [employer]’s FollowMyHealth portal, you will also be able to view your health information using other applications (apps) compatible with our system.

## 2022-05-08 NOTE — ED PROVIDER NOTE - ATTENDING APP SHARED VISIT CONTRIBUTION OF CARE
This was a shared visit with CHARLY. I reviewed and verified the documentation and independently performed the documented MDM.

## 2022-05-08 NOTE — ED PROVIDER NOTE - OBJECTIVE STATEMENT
68 y/o female with PMHX HTN and DM BIBA from  due to covid and hypoxia. As per EMS,  noted O2 of 92%. EMS reported no hypoxia since they arrived. pt reports havnig headache and nasal congestion since Thursday. Reports tested at home positive today. reports she went to urgent care today due to dried nasal congestion and ear pain. pt denies chest pain, SOB, fever, leg swelling, hemoptysis, cough, or any other complaints.

## 2022-05-10 ENCOUNTER — TRANSCRIPTION ENCOUNTER (OUTPATIENT)
Age: 67
End: 2022-05-10

## 2022-05-12 ENCOUNTER — APPOINTMENT (OUTPATIENT)
Dept: DISASTER EMERGENCY | Facility: HOSPITAL | Age: 67
End: 2022-05-12

## 2022-05-12 ENCOUNTER — OUTPATIENT (OUTPATIENT)
Dept: OUTPATIENT SERVICES | Facility: HOSPITAL | Age: 67
LOS: 1 days | End: 2022-05-12

## 2022-05-12 VITALS
SYSTOLIC BLOOD PRESSURE: 121 MMHG | DIASTOLIC BLOOD PRESSURE: 72 MMHG | HEART RATE: 89 BPM | TEMPERATURE: 98 F | RESPIRATION RATE: 18 BRPM | OXYGEN SATURATION: 97 %

## 2022-05-12 VITALS
WEIGHT: 147.93 LBS | TEMPERATURE: 98 F | RESPIRATION RATE: 18 BRPM | DIASTOLIC BLOOD PRESSURE: 57 MMHG | SYSTOLIC BLOOD PRESSURE: 158 MMHG | HEIGHT: 64 IN | OXYGEN SATURATION: 98 % | HEART RATE: 94 BPM

## 2022-05-12 DIAGNOSIS — C50.919 MALIGNANT NEOPLASM OF UNSPECIFIED SITE OF UNSPECIFIED FEMALE BREAST: Chronic | ICD-10-CM

## 2022-05-12 DIAGNOSIS — U07.1 COVID-19: ICD-10-CM

## 2022-05-12 PROBLEM — E11.9 TYPE 2 DIABETES MELLITUS WITHOUT COMPLICATIONS: Chronic | Status: ACTIVE | Noted: 2022-05-08

## 2022-05-12 PROBLEM — E78.5 HYPERLIPIDEMIA, UNSPECIFIED: Chronic | Status: ACTIVE | Noted: 2022-05-08

## 2022-05-12 RX ORDER — BEBTELOVIMAB 87.5 MG/ML
175 INJECTION, SOLUTION INTRAVENOUS ONCE
Refills: 0 | Status: DISCONTINUED | OUTPATIENT
Start: 2022-05-12 | End: 2022-05-26

## 2022-05-12 NOTE — CHART NOTE - NSCHARTNOTEFT_GEN_A_CORE
CC: Monoclonal Antibody Infusion/COVID 19 Positive  67y Female with PMH HLD, T2DM  and recent dx of COVID 19+ who presents today for elective Bebtelovimab. Patient has been screened and was deemed to be a candidate.    Symptoms/ Criteria: Fever, Chills, Malaise  Date of Symptom Onset: 5/5  Symptoms: Fever, Chills, Malaise  Date of Positive COVID PCR: 5/6  Risk Profile includes:  age>55     Vaccination Status:     PMHx:  No pertinent family history in first degree relatives    Infection due to severe acute respiratory syndrome coronavirus 2 (SARS-CoV-2)    Diabetes mellitus, type 2    HLD (hyperlipidemia)    Stress incontinence in female    Bladder mass    Chronic GERD    HLD (hyperlipidemia)    DM (diabetes mellitus)    No significant past surgical history    Breast cancer        Exam/findings:  T(C): 36.9 (05-12-22 @ 09:29), Max: 36.9 (05-12-22 @ 09:29)  HR: 94 (05-12-22 @ 09:29) (94 - 94)  BP: 158/57 (05-12-22 @ 09:29) (158/57 - 158/57)  RR: 18 (05-12-22 @ 09:29) (18 - 18)  SpO2: 98% (05-12-22 @ 09:29) (98% - 98%)    PE:   Appearance: NAD	  HEENT:  NC/AT  Cardiovascular:  No edema  Respiratory: no use of accessory muscles  Gastrointestinal:  non-distended   Skin: warm and dry  Neurologic: Non-focal  Extremities: Normal range of motion    ASSESSMENT:  Pt is COVID positive with mild to moderate symptoms who was referred for elective MAB (Bebtelovimab). referred By Dr cardona    PLAN:  - MAB treatment explained to patient. I have reviewed the Bebtelovimab Emergency Use Authorization (EUA) and I have provided the patient or patient's caregiver with the following information:   1. FDA has authorized emergency use of Bebtelovimab to be administered for the treatment of mild to moderate COVID-19, which is not an FDA-approved biological product.   2. The patient or patient's caregiver has the option to accept or refuse administration of MAB.   3. The significant known and potential risks and benefits of Bebtelovimab and the extent to which such risks and benefits are unknown.  4. Information on available alternative treatments and risks and benefits of those alternatives.  - Patient verbalized understanding of plan and agrees to treatment. All questions answered.  - Consent for MAB obtained.   - 175mg of Bebtelovimab administered as a single intravenous injection over at least 30 seconds.   - Observe patient for one hour post medication administration and then if stable, discharge home with outpatient follow up as planned by PCP.      POST ASSESSMENT:   Patient completed MAB, and monitored x 1 hour post-infusion with no adverse reactions noted, remained hemodynamically stable.  - Patient tolerated infusion well; denies complaints of chest pain/SOB/dizziness/palpitations.   - VSS for discharge home.  - D/C instructions given/ fact sheet included.  - Patient was instructed to self-isolate and use infection control measures (e.g wear mask, isolate, social distance, avoid sharing personal items, clean and disinfect "high touch" surfaces, and frequent handwashing according to the CDC guidelines.   - The patient was informed on what symptoms to be aware of for the next couple of days, and if there are any issues to call the 24/7 clinical call center. Patient was instructed to follow up with primary care provider as needed.    DISCHARGE at __________

## 2022-05-16 ENCOUNTER — TRANSCRIPTION ENCOUNTER (OUTPATIENT)
Age: 67
End: 2022-05-16

## 2023-01-05 ENCOUNTER — APPOINTMENT (OUTPATIENT)
Dept: UROLOGY | Facility: CLINIC | Age: 68
End: 2023-01-05
Payer: MEDICARE

## 2023-01-05 VITALS
WEIGHT: 146 LBS | DIASTOLIC BLOOD PRESSURE: 67 MMHG | BODY MASS INDEX: 25.87 KG/M2 | HEART RATE: 69 BPM | HEIGHT: 63 IN | SYSTOLIC BLOOD PRESSURE: 108 MMHG | RESPIRATION RATE: 16 BRPM

## 2023-01-05 DIAGNOSIS — R35.0 FREQUENCY OF MICTURITION: ICD-10-CM

## 2023-01-05 PROCEDURE — 99214 OFFICE O/P EST MOD 30 MIN: CPT

## 2023-01-05 RX ORDER — MIRABEGRON 25 MG/1
25 TABLET, FILM COATED, EXTENDED RELEASE ORAL
Qty: 30 | Refills: 11 | Status: ACTIVE | COMMUNITY
Start: 2023-01-05 | End: 1900-01-01

## 2023-01-05 RX ORDER — REPAGLINIDE 1 MG/1
TABLET ORAL
Refills: 0 | Status: ACTIVE | COMMUNITY

## 2023-01-05 RX ORDER — SITAGLIPTIN AND METFORMIN HYDROCHLORIDE 50; 1000 MG/1; MG/1
50-1000 TABLET, FILM COATED, EXTENDED RELEASE ORAL DAILY
Refills: 0 | Status: COMPLETED | COMMUNITY
Start: 2020-12-18 | End: 2023-01-05

## 2023-01-05 RX ORDER — PIOGLITAZONE HYDROCHLORIDE 45 MG/1
TABLET ORAL
Refills: 0 | Status: ACTIVE | COMMUNITY

## 2023-01-12 ENCOUNTER — OFFICE (OUTPATIENT)
Dept: URBAN - METROPOLITAN AREA CLINIC 112 | Facility: CLINIC | Age: 68
Setting detail: OPHTHALMOLOGY
End: 2023-01-12
Payer: MEDICARE

## 2023-01-12 DIAGNOSIS — H52.4: ICD-10-CM

## 2023-01-12 DIAGNOSIS — E11.3391: ICD-10-CM

## 2023-01-12 DIAGNOSIS — H01.002: ICD-10-CM

## 2023-01-12 DIAGNOSIS — H01.005: ICD-10-CM

## 2023-01-12 DIAGNOSIS — E11.3292: ICD-10-CM

## 2023-01-12 DIAGNOSIS — H04.123: ICD-10-CM

## 2023-01-12 PROCEDURE — 92015 DETERMINE REFRACTIVE STATE: CPT | Performed by: OPHTHALMOLOGY

## 2023-01-12 PROCEDURE — 92014 COMPRE OPH EXAM EST PT 1/>: CPT | Performed by: OPHTHALMOLOGY

## 2023-01-12 PROCEDURE — 83861 MICROFLUID ANALY TEARS: CPT | Performed by: OPHTHALMOLOGY

## 2023-01-12 PROCEDURE — 92250 FUNDUS PHOTOGRAPHY W/I&R: CPT | Performed by: OPHTHALMOLOGY

## 2023-01-12 ASSESSMENT — REFRACTION_AUTOREFRACTION
OD_CYLINDER: -0.75
OS_AXIS: 053
OS_CYLINDER: -1.50
OD_AXIS: 074
OS_SPHERE: -0.25
OD_SPHERE: -0.25

## 2023-01-12 ASSESSMENT — REFRACTION_CURRENTRX
OS_ADD: +3.00
OS_CYLINDER: -1.00
OD_ADD: +3.00
OS_VPRISM_DIRECTION: BF
OS_ADD: +3.00
OS_VPRISM_DIRECTION: BF
OD_CYLINDER: -0.50
OS_SPHERE: +0.25
OD_OVR_VA: 20/
OD_AXIS: 072
OS_OVR_VA: 20/
OS_SPHERE: PLANO
OD_VPRISM_DIRECTION: BF
OS_AXIS: 066
OD_AXIS: 079
OD_OVR_VA: 20/
OD_VPRISM_DIRECTION: BF
OS_OVR_VA: 20/
OD_CYLINDER: -0.50
OD_ADD: +3.00
OD_SPHERE: PLANO
OS_AXIS: 066
OD_SPHERE: PLANO
OS_CYLINDER: -1.00

## 2023-01-12 ASSESSMENT — REFRACTION_MANIFEST
OD_AXIS: 075
OS_ADD: +2.50
OU_VA: 20/20
OD_ADD: +2.75
OD_SPHERE: -0.25
OS_ADD: +2.75
OS_CYLINDER: -0.50
OS_AXIS: 074
OD_VA1: 20/20
OD_CYLINDER: -0.75
OD_AXIS: 090
OS_VA2: 20/20
OD_VA2: 20/20
OU_VA: 20/20
OD_VA1: 20/25
OS_SPHERE: PLANO
OS_AXIS: 065
OS_VA2: 20/20
OD_SPHERE: PLANO
OS_VA2: 20/20
OD_ADD: +2.50
OS_SPHERE: PLANO
OS_CYLINDER: -1.00
OS_VA1: 20/25
OS_SPHERE: -0.25
OD_SPHERE: PLANO
OD_AXIS: 079
OD_VA2: 20/20
OD_ADD: +3.00
OS_VA1: 20/20
OS_AXIS: 055
OS_VA1: 20/25
OS_ADD: +3.00
OS_CYLINDER: -1.25
OD_CYLINDER: -0.50
OD_VA2: 20/20
OD_VA1: 20/20
OD_CYLINDER: -0.75

## 2023-01-12 ASSESSMENT — SPHEQUIV_DERIVED
OS_SPHEQUIV: -1
OD_SPHEQUIV: -0.625
OD_SPHEQUIV: -0.625
OS_SPHEQUIV: -0.875

## 2023-01-12 ASSESSMENT — KERATOMETRY
OD_K2POWER_DIOPTERS: 44.50
OD_AXISANGLE_DEGREES: 154
METHOD_AUTO_MANUAL: AUTO
OD_K1POWER_DIOPTERS: 44.00
OS_K2POWER_DIOPTERS: 45.00
OS_AXISANGLE_DEGREES: 140
OS_K1POWER_DIOPTERS: 44.00

## 2023-01-12 ASSESSMENT — CONFRONTATIONAL VISUAL FIELD TEST (CVF)
OS_FINDINGS: FULL
OD_FINDINGS: FULL

## 2023-01-12 ASSESSMENT — LID EXAM ASSESSMENTS
OD_BLEPHARITIS: RLL T
OS_BLEPHARITIS: LLL T

## 2023-01-12 ASSESSMENT — LACRIMAL DUCT - ASSESSMENT
OS_LACRIMAL_DUCT: PARTIAL NLDO
OD_LACRIMAL_DUCT: PARTIAL NLDO

## 2023-01-12 ASSESSMENT — AXIALLENGTH_DERIVED
OS_AL: 23.5655
OD_AL: 23.56
OD_AL: 23.56
OS_AL: 23.6142

## 2023-01-12 ASSESSMENT — VISUAL ACUITY
OD_BCVA: 20/25
OS_BCVA: 20/30+1

## 2023-01-12 ASSESSMENT — TONOMETRY
OD_IOP_MMHG: 19
OS_IOP_MMHG: 13

## 2023-01-12 ASSESSMENT — SUPERFICIAL PUNCTATE KERATITIS (SPK)
OD_SPK: T
OS_SPK: T

## 2023-01-12 ASSESSMENT — LID POSITION - ECTROPION
OD_ECTROPION: 1+
OS_ECTROPION: 1+

## 2023-01-16 NOTE — HISTORY OF PRESENT ILLNESS
[FreeTextEntry1] : Patient is a 67 year old woman comes in today with complaints of urinary frequency \par Previously was prescribed Trospium, but she does not recall picking up medication\par \par Feels that she does not empty at times \par Has increased urinary frequency, denies urgency \par Nocturia x 3 \par Occasionally burning with urination, last experienced 2 weeks ago\par Denies any history of UTI\par \par History of diabetes, November 2022  HgbA1c 6.9\par \par

## 2023-01-16 NOTE — ASSESSMENT
-- DO NOT REPLY / DO NOT REPLY ALL --  -- Message is from the [facility]--    COVID-19 Universal Screening: N/A - Not about scheduling    General Patient Message      Reason for Call:  PRIOR AUTHORIZATION NEEDED AND INCLUDE   DIAGNOSIS CODE FOR      EPINEPHRINE AND ANY OTHER DRUGS THAT WERE TRIED AND FAILED     REF#[id number]    Caller Information       Type Contact Phone    04/07/2021 09:33 AM CDT Phone (Incoming) [facility]  [phone]     OPTION 3          Alternative phone number: N/A     Turnaround time given to caller:   \"This message will be sent to [state Provider's name]. The clinical team will fulfill your request as soon as they review your message.\"     [FreeTextEntry1] : PVR performed in office today 0 ml \par \par Recommend trial of mirabegron 25 mg once daily.\par Goals of medication reviewed.  Discussed the potential adverse effects of the medication.  Discussed the proper administration of the medication.\par \par

## 2023-04-13 ENCOUNTER — OFFICE (OUTPATIENT)
Dept: URBAN - METROPOLITAN AREA CLINIC 63 | Facility: CLINIC | Age: 68
Setting detail: OPHTHALMOLOGY
End: 2023-04-13
Payer: MEDICARE

## 2023-04-13 DIAGNOSIS — H01.002: ICD-10-CM

## 2023-04-13 DIAGNOSIS — H01.005: ICD-10-CM

## 2023-04-13 PROBLEM — H10.45 ALLERGIC CONJUNCTIVITIS: Status: ACTIVE | Noted: 2023-04-13

## 2023-04-13 PROCEDURE — 92012 INTRM OPH EXAM EST PATIENT: CPT | Performed by: STUDENT IN AN ORGANIZED HEALTH CARE EDUCATION/TRAINING PROGRAM

## 2023-04-13 ASSESSMENT — CONFRONTATIONAL VISUAL FIELD TEST (CVF)
OS_FINDINGS: FULL
OD_FINDINGS: FULL

## 2023-04-13 ASSESSMENT — KERATOMETRY
OS_K1POWER_DIOPTERS: 44.00
OD_AXISANGLE_DEGREES: 154
METHOD_AUTO_MANUAL: AUTO
OS_AXISANGLE_DEGREES: 138
OD_K2POWER_DIOPTERS: 44.50
OD_K1POWER_DIOPTERS: 44.25
OS_K2POWER_DIOPTERS: 45.00

## 2023-04-13 ASSESSMENT — REFRACTION_MANIFEST
OD_SPHERE: PLANO
OD_CYLINDER: -0.50
OD_CYLINDER: -0.75
OD_VA1: 20/20
OD_AXIS: 075
OS_VA2: 20/20
OD_VA1: 20/20
OS_ADD: +2.75
OS_SPHERE: -0.25
OD_VA2: 20/20
OS_ADD: +2.50
OS_VA1: 20/20
OD_VA2: 20/20
OS_SPHERE: PLANO
OD_ADD: +3.00
OS_ADD: +3.00
OS_CYLINDER: -1.00
OS_VA2: 20/20
OS_SPHERE: PLANO
OD_SPHERE: -0.25
OD_VA2: 20/20
OD_VA1: 20/25
OD_ADD: +2.50
OU_VA: 20/20
OS_VA1: 20/25
OS_AXIS: 065
OD_ADD: +2.75
OU_VA: 20/20
OD_CYLINDER: -0.75
OS_AXIS: 055
OD_AXIS: 090
OS_VA1: 20/25
OS_CYLINDER: -0.50
OD_SPHERE: PLANO
OS_AXIS: 074
OS_CYLINDER: -1.25
OS_VA2: 20/20
OD_AXIS: 079

## 2023-04-13 ASSESSMENT — LID EXAM ASSESSMENTS
OS_BLEPHARITIS: LLL T
OD_MEIBOMITIS: RLL 1+
OD_BLEPHARITIS: RLL T
OS_MEIBOMITIS: LLL 1+

## 2023-04-13 ASSESSMENT — TEAR BREAK UP TIME (TBUT)
OD_TBUT: 1+
OS_TBUT: 1+

## 2023-04-13 ASSESSMENT — REFRACTION_CURRENTRX
OD_CYLINDER: -0.50
OD_VPRISM_DIRECTION: BF
OS_CYLINDER: -1.00
OD_OVR_VA: 20/
OS_ADD: +3.00
OS_OVR_VA: 20/
OS_VPRISM_DIRECTION: BF
OD_SPHERE: PLANO
OD_AXIS: 079
OS_AXIS: 066
OS_SPHERE: PLANO
OD_ADD: +3.00
OS_AXIS: 066
OD_CYLINDER: -0.50
OS_ADD: +3.00
OS_VPRISM_DIRECTION: BF
OD_ADD: +3.00
OD_OVR_VA: 20/
OD_SPHERE: PLANO
OD_VPRISM_DIRECTION: BF
OS_SPHERE: +0.25
OD_AXIS: 072
OS_OVR_VA: 20/
OS_CYLINDER: -1.00

## 2023-04-13 ASSESSMENT — SUPERFICIAL PUNCTATE KERATITIS (SPK)
OD_SPK: 3+
OS_SPK: 3+

## 2023-04-13 ASSESSMENT — TONOMETRY
OS_IOP_MMHG: 17
OD_IOP_MMHG: 17

## 2023-04-13 ASSESSMENT — SPHEQUIV_DERIVED
OD_SPHEQUIV: -0.625
OS_SPHEQUIV: -0.875
OD_SPHEQUIV: -0.625
OS_SPHEQUIV: -1.125

## 2023-04-13 ASSESSMENT — LACRIMAL DUCT - ASSESSMENT
OS_LACRIMAL_DUCT: PARTIAL NLDO
OD_LACRIMAL_DUCT: PARTIAL NLDO

## 2023-04-13 ASSESSMENT — REFRACTION_AUTOREFRACTION
OD_SPHERE: -0.25
OS_CYLINDER: -1.25
OD_AXIS: 074
OS_AXIS: 054
OS_SPHERE: -0.50
OD_CYLINDER: -0.75

## 2023-04-13 ASSESSMENT — AXIALLENGTH_DERIVED
OS_AL: 23.6631
OS_AL: 23.5655
OD_AL: 23.5143
OD_AL: 23.5143

## 2023-04-13 ASSESSMENT — VISUAL ACUITY
OS_BCVA: 20/25
OD_BCVA: 20/30

## 2023-04-13 ASSESSMENT — LID POSITION - ECTROPION
OS_ECTROPION: 1+
OD_ECTROPION: 1+

## 2023-06-15 ENCOUNTER — OFFICE (OUTPATIENT)
Dept: URBAN - METROPOLITAN AREA CLINIC 63 | Facility: CLINIC | Age: 68
Setting detail: OPHTHALMOLOGY
End: 2023-06-15
Payer: MEDICARE

## 2023-06-15 DIAGNOSIS — H40.013: ICD-10-CM

## 2023-06-15 DIAGNOSIS — H01.002: ICD-10-CM

## 2023-06-15 DIAGNOSIS — H01.005: ICD-10-CM

## 2023-06-15 DIAGNOSIS — H35.361: ICD-10-CM

## 2023-06-15 PROBLEM — E11.9 DIABETES TYPE 2 NO RETINOPATHY: Status: ACTIVE | Noted: 2023-06-15

## 2023-06-15 PROCEDURE — 92083 EXTENDED VISUAL FIELD XM: CPT | Performed by: STUDENT IN AN ORGANIZED HEALTH CARE EDUCATION/TRAINING PROGRAM

## 2023-06-15 PROCEDURE — 92014 COMPRE OPH EXAM EST PT 1/>: CPT | Performed by: STUDENT IN AN ORGANIZED HEALTH CARE EDUCATION/TRAINING PROGRAM

## 2023-06-15 PROCEDURE — 92133 CPTRZD OPH DX IMG PST SGM ON: CPT | Performed by: STUDENT IN AN ORGANIZED HEALTH CARE EDUCATION/TRAINING PROGRAM

## 2023-06-15 ASSESSMENT — KERATOMETRY
OS_K1POWER_DIOPTERS: 43.75
OD_K2POWER_DIOPTERS: 44.50
OD_K1POWER_DIOPTERS: 44.50
OD_AXISANGLE_DEGREES: 090
OS_K2POWER_DIOPTERS: 45.25
METHOD_AUTO_MANUAL: AUTO
OS_AXISANGLE_DEGREES: 118

## 2023-06-15 ASSESSMENT — LID EXAM ASSESSMENTS
OS_MEIBOMITIS: LLL 1+
OD_BLEPHARITIS: RLL T
OS_BLEPHARITIS: LLL T
OD_MEIBOMITIS: RLL 1+

## 2023-06-15 ASSESSMENT — CONFRONTATIONAL VISUAL FIELD TEST (CVF)
OD_FINDINGS: FULL
OS_FINDINGS: FULL

## 2023-06-15 ASSESSMENT — TONOMETRY
OD_IOP_MMHG: 19
OD_IOP_MMHG: 19
OS_IOP_MMHG: 19

## 2023-06-15 ASSESSMENT — TEAR BREAK UP TIME (TBUT)
OS_TBUT: 1+
OD_TBUT: 1+

## 2023-06-15 ASSESSMENT — LACRIMAL DUCT - ASSESSMENT
OD_LACRIMAL_DUCT: PARTIAL NLDO
OS_LACRIMAL_DUCT: PARTIAL NLDO

## 2023-06-15 ASSESSMENT — VISUAL ACUITY
OD_BCVA: 20/25
OS_BCVA: 20/25

## 2023-06-15 ASSESSMENT — SUPERFICIAL PUNCTATE KERATITIS (SPK)
OS_SPK: 3+
OD_SPK: 3+

## 2023-06-15 ASSESSMENT — LID POSITION - ECTROPION
OS_ECTROPION: 1+
OD_ECTROPION: 1+

## 2023-08-06 ASSESSMENT — REFRACTION_CURRENTRX
OS_VPRISM_DIRECTION: BF
OD_ADD: +3.00
OS_SPHERE: +0.25
OD_OVR_VA: 20/
OS_CYLINDER: -0.75
OS_VPRISM_DIRECTION: BF
OS_SPHERE: +0.25
OD_VPRISM_DIRECTION: BF
OD_SPHERE: PLANO
OS_CYLINDER: -1.00
OS_ADD: +3.00
OD_SPHERE: PLANO
OS_OVR_VA: 20/
OS_AXIS: 041
OS_SPHERE: PLANO
OD_AXIS: 072
OS_AXIS: 066
OD_OVR_VA: 20/
OS_AXIS: 066
OD_OVR_VA: 20/
OD_ADD: +2.75
OD_CYLINDER: -0.50
OD_CYLINDER: -0.50
OD_ADD: +3.00
OD_CYLINDER: -0.25
OS_OVR_VA: 20/
OS_ADD: +3.00
OS_ADD: +3.00
OD_VPRISM_DIRECTION: BF
OS_OVR_VA: 20/
OS_CYLINDER: -1.00
OD_AXIS: 150
OD_SPHERE: PLANO
OD_AXIS: 079

## 2023-08-06 ASSESSMENT — KERATOMETRY
OD_AXISANGLE_DEGREES: 090
METHOD_AUTO_MANUAL: AUTO
OS_K1POWER_DIOPTERS: 43.75
OS_AXISANGLE_DEGREES: 118
OD_K2POWER_DIOPTERS: 44.50
OD_K1POWER_DIOPTERS: 44.50
OS_K2POWER_DIOPTERS: 45.25

## 2023-08-06 ASSESSMENT — REFRACTION_MANIFEST
OS_AXIS: 074
OS_CYLINDER: -0.50
OD_ADD: +3.00
OS_VA2: 20/20
OD_CYLINDER: -0.75
OD_VA1: 20/20
OS_CYLINDER: -1.25
OS_ADD: +3.00
OD_VA2: 20/20
OD_ADD: +2.50
OS_CYLINDER: -1.00
OD_SPHERE: PLANO
OU_VA: 20/20
OD_SPHERE: -0.25
OS_SPHERE: PLANO
OD_AXIS: 079
OS_VA1: 20/20
OS_VA2: 20/20
OS_ADD: +2.75
OD_CYLINDER: -0.75
OS_SPHERE: -0.25
OS_VA1: 20/25
OD_CYLINDER: -0.50
OD_SPHERE: PLANO
OS_VA1: 20/25
OS_ADD: +2.50
OD_VA1: 20/25
OU_VA: 20/20
OD_AXIS: 090
OS_VA2: 20/20
OS_SPHERE: PLANO
OS_AXIS: 055
OD_VA2: 20/20
OD_AXIS: 075
OD_ADD: +2.75
OS_AXIS: 065
OD_VA2: 20/20
OD_VA1: 20/20

## 2023-08-06 ASSESSMENT — AXIALLENGTH_DERIVED
OS_AL: 23.5655
OD_AL: 23.1362
OS_AL: 23.3728
OD_AL: 23.4687

## 2023-08-06 ASSESSMENT — REFRACTION_AUTOREFRACTION
OS_CYLINDER: -0.75
OS_AXIS: 037
OD_SPHERE: +0.50
OS_SPHERE: 0.00
OD_CYLINDER: -0.50
OD_AXIS: 108

## 2023-08-06 ASSESSMENT — SPHEQUIV_DERIVED
OD_SPHEQUIV: -0.625
OD_SPHEQUIV: 0.25
OS_SPHEQUIV: -0.375
OS_SPHEQUIV: -0.875

## 2023-11-01 ENCOUNTER — OFFICE (OUTPATIENT)
Dept: URBAN - METROPOLITAN AREA CLINIC 63 | Facility: CLINIC | Age: 68
Setting detail: OPHTHALMOLOGY
End: 2023-11-01
Payer: MEDICARE

## 2023-11-01 DIAGNOSIS — H01.002: ICD-10-CM

## 2023-11-01 DIAGNOSIS — H01.005: ICD-10-CM

## 2023-11-01 DIAGNOSIS — H40.013: ICD-10-CM

## 2023-11-01 DIAGNOSIS — H35.361: ICD-10-CM

## 2023-11-01 PROCEDURE — 92134 CPTRZ OPH DX IMG PST SGM RTA: CPT | Performed by: STUDENT IN AN ORGANIZED HEALTH CARE EDUCATION/TRAINING PROGRAM

## 2023-11-01 PROCEDURE — 76514 ECHO EXAM OF EYE THICKNESS: CPT | Performed by: STUDENT IN AN ORGANIZED HEALTH CARE EDUCATION/TRAINING PROGRAM

## 2023-11-01 PROCEDURE — 92012 INTRM OPH EXAM EST PATIENT: CPT | Performed by: STUDENT IN AN ORGANIZED HEALTH CARE EDUCATION/TRAINING PROGRAM

## 2023-11-01 ASSESSMENT — LID EXAM ASSESSMENTS
OS_BLEPHARITIS: LLL T
OD_MEIBOMITIS: RLL 1+
OD_BLEPHARITIS: RLL T
OS_MEIBOMITIS: LLL 1+

## 2023-11-01 ASSESSMENT — REFRACTION_CURRENTRX
OD_VPRISM_DIRECTION: BF
OS_OVR_VA: 20/
OD_ADD: +3.00
OS_SPHERE: +0.25
OD_CYLINDER: -0.50
OS_OVR_VA: 20/
OD_OVR_VA: 20/
OD_VPRISM_DIRECTION: BF
OD_SPHERE: PLANO
OS_CYLINDER: -0.75
OS_VPRISM_DIRECTION: BF
OD_CYLINDER: -0.25
OS_ADD: +3.00
OD_AXIS: 150
OS_AXIS: 041
OS_CYLINDER: -1.00
OS_SPHERE: +0.25
OD_SPHERE: PLANO
OD_CYLINDER: -0.50
OS_CYLINDER: -1.00
OD_SPHERE: PLANO
OS_OVR_VA: 20/
OD_ADD: +2.75
OS_AXIS: 066
OD_AXIS: 079
OD_OVR_VA: 20/
OS_AXIS: 066
OD_OVR_VA: 20/
OD_AXIS: 072
OS_ADD: +3.00
OS_ADD: +3.00
OS_SPHERE: PLANO
OD_ADD: +3.00
OS_VPRISM_DIRECTION: BF

## 2023-11-01 ASSESSMENT — REFRACTION_MANIFEST
OS_CYLINDER: -0.50
OD_AXIS: 090
OD_VA2: 20/20
OS_ADD: +3.00
OS_VA1: 20/25
OS_CYLINDER: -1.25
OD_VA2: 20/20
OD_AXIS: 075
OD_VA1: 20/20
OS_VA1: 20/25
OD_SPHERE: -0.25
OS_VA2: 20/20
OS_SPHERE: PLANO
OS_ADD: +2.75
OU_VA: 20/20
OD_CYLINDER: -0.75
OD_SPHERE: PLANO
OU_VA: 20/20
OS_VA1: 20/20
OS_ADD: +2.50
OS_VA2: 20/20
OS_SPHERE: PLANO
OS_AXIS: 065
OD_VA1: 20/20
OD_ADD: +2.50
OD_SPHERE: PLANO
OD_CYLINDER: -0.75
OD_VA1: 20/25
OD_AXIS: 079
OS_AXIS: 055
OS_VA2: 20/20
OD_ADD: +2.75
OS_CYLINDER: -1.00
OS_AXIS: 074
OD_CYLINDER: -0.50
OS_SPHERE: -0.25
OD_ADD: +3.00
OD_VA2: 20/20

## 2023-11-01 ASSESSMENT — REFRACTION_AUTOREFRACTION
OD_SPHERE: 0.00
OD_AXIS: 065
OD_CYLINDER: -0.75
OS_CYLINDER: -1.25
OS_SPHERE: -0.25
OS_AXIS: 057

## 2023-11-01 ASSESSMENT — SPHEQUIV_DERIVED
OD_SPHEQUIV: -0.625
OS_SPHEQUIV: -0.875
OD_SPHEQUIV: -0.375
OS_SPHEQUIV: -0.875

## 2023-11-01 ASSESSMENT — LID POSITION - ECTROPION
OD_ECTROPION: 1+
OS_ECTROPION: 1+

## 2023-11-01 ASSESSMENT — CONFRONTATIONAL VISUAL FIELD TEST (CVF)
OS_FINDINGS: FULL
OD_FINDINGS: FULL

## 2023-11-01 ASSESSMENT — LACRIMAL DUCT - ASSESSMENT
OD_LACRIMAL_DUCT: PARTIAL NLDO
OS_LACRIMAL_DUCT: PARTIAL NLDO

## 2023-11-01 ASSESSMENT — TEAR BREAK UP TIME (TBUT)
OD_TBUT: 1+
OS_TBUT: 1+

## 2023-11-01 ASSESSMENT — SUPERFICIAL PUNCTATE KERATITIS (SPK)
OS_SPK: 3+
OD_SPK: 3+

## 2024-05-02 ENCOUNTER — NON-APPOINTMENT (OUTPATIENT)
Age: 69
End: 2024-05-02

## 2024-05-16 ENCOUNTER — OFFICE (OUTPATIENT)
Dept: URBAN - METROPOLITAN AREA CLINIC 63 | Facility: CLINIC | Age: 69
Setting detail: OPHTHALMOLOGY
End: 2024-05-16
Payer: COMMERCIAL

## 2024-05-16 DIAGNOSIS — H40.013: ICD-10-CM

## 2024-05-16 DIAGNOSIS — H52.4: ICD-10-CM

## 2024-05-16 PROCEDURE — 92012 INTRM OPH EXAM EST PATIENT: CPT | Performed by: STUDENT IN AN ORGANIZED HEALTH CARE EDUCATION/TRAINING PROGRAM

## 2024-05-16 PROCEDURE — 92015 DETERMINE REFRACTIVE STATE: CPT | Performed by: STUDENT IN AN ORGANIZED HEALTH CARE EDUCATION/TRAINING PROGRAM

## 2024-05-16 PROCEDURE — 92250 FUNDUS PHOTOGRAPHY W/I&R: CPT | Performed by: STUDENT IN AN ORGANIZED HEALTH CARE EDUCATION/TRAINING PROGRAM

## 2024-05-16 ASSESSMENT — LID EXAM ASSESSMENTS
OD_MEIBOMITIS: RLL 1+
OD_BLEPHARITIS: RLL T
OS_BLEPHARITIS: LLL T
OS_MEIBOMITIS: LLL 1+

## 2024-05-16 ASSESSMENT — LID POSITION - ECTROPION
OD_ECTROPION: 1+
OS_ECTROPION: 1+

## 2024-05-16 ASSESSMENT — CONFRONTATIONAL VISUAL FIELD TEST (CVF)
OD_FINDINGS: FULL
OS_FINDINGS: FULL

## 2024-06-06 NOTE — HISTORY OF PRESENT ILLNESS
06/06/24      To Whom it may concern:    Dr. Hussein Elliott is requesting medical records for:  Marnie Babb   1990   [phone]     For continuation of care. Please fax all notes, UDS labs, and imaging reports. Specifically Lumbar, Cervical and R Shoulder MRI/XRAYS    Please fax the records to us at [phone].      Thank you,    [facility]  [facility] [place] [id number]  Phone: [phone]  Fax: [phone]                
[FreeTextEntry1] : s/p TURBT 3/6/2020\par Intra-op findings consistent with leiomyoma.  \par Path pending.\par \par Discharged with Hart catheter.\par Urine remains clear.\par \par Mild RLQ abdominal pain.  No fever/chills, N/V.

## 2024-09-25 ENCOUNTER — OFFICE (OUTPATIENT)
Dept: URBAN - METROPOLITAN AREA CLINIC 63 | Facility: CLINIC | Age: 69
Setting detail: OPHTHALMOLOGY
End: 2024-09-25
Payer: COMMERCIAL

## 2024-09-25 DIAGNOSIS — H16.223: ICD-10-CM

## 2024-09-25 DIAGNOSIS — Z96.1: ICD-10-CM

## 2024-09-25 DIAGNOSIS — H35.361: ICD-10-CM

## 2024-09-25 DIAGNOSIS — H43.393: ICD-10-CM

## 2024-09-25 DIAGNOSIS — H40.013: ICD-10-CM

## 2024-09-25 DIAGNOSIS — H01.002: ICD-10-CM

## 2024-09-25 DIAGNOSIS — H01.005: ICD-10-CM

## 2024-09-25 DIAGNOSIS — E11.9: ICD-10-CM

## 2024-09-25 PROCEDURE — 92014 COMPRE OPH EXAM EST PT 1/>: CPT | Performed by: STUDENT IN AN ORGANIZED HEALTH CARE EDUCATION/TRAINING PROGRAM

## 2024-09-25 PROCEDURE — 92134 CPTRZ OPH DX IMG PST SGM RTA: CPT | Performed by: STUDENT IN AN ORGANIZED HEALTH CARE EDUCATION/TRAINING PROGRAM

## 2024-09-25 ASSESSMENT — LID EXAM ASSESSMENTS
OS_BLEPHARITIS: LLL T
OD_BLEPHARITIS: RLL T
OD_MEIBOMITIS: RLL 1+
OS_MEIBOMITIS: LLL 1+

## 2024-09-25 ASSESSMENT — CONFRONTATIONAL VISUAL FIELD TEST (CVF)
OS_FINDINGS: FULL
OD_FINDINGS: FULL

## 2024-09-25 ASSESSMENT — LID POSITION - ECTROPION
OS_ECTROPION: 1+
OD_ECTROPION: 1+